# Patient Record
Sex: MALE | Race: WHITE | NOT HISPANIC OR LATINO | URBAN - METROPOLITAN AREA
[De-identification: names, ages, dates, MRNs, and addresses within clinical notes are randomized per-mention and may not be internally consistent; named-entity substitution may affect disease eponyms.]

---

## 2019-05-03 ENCOUNTER — OUTPATIENT (OUTPATIENT)
Dept: OUTPATIENT SERVICES | Facility: HOSPITAL | Age: 59
LOS: 1 days | End: 2019-05-03
Payer: COMMERCIAL

## 2019-05-03 ENCOUNTER — INPATIENT (INPATIENT)
Facility: HOSPITAL | Age: 59
LOS: 9 days | Discharge: HOME | DRG: 885 | End: 2019-05-13
Attending: PSYCHIATRY & NEUROLOGY | Admitting: PSYCHIATRY & NEUROLOGY
Payer: COMMERCIAL

## 2019-05-03 VITALS
TEMPERATURE: 98 F | WEIGHT: 195.11 LBS | RESPIRATION RATE: 18 BRPM | SYSTOLIC BLOOD PRESSURE: 132 MMHG | OXYGEN SATURATION: 97 % | HEIGHT: 72 IN | HEART RATE: 96 BPM | DIASTOLIC BLOOD PRESSURE: 84 MMHG

## 2019-05-03 DIAGNOSIS — F32.2 MAJOR DEPRESSIVE DISORDER, SINGLE EPISODE, SEVERE WITHOUT PSYCHOTIC FEATURES: ICD-10-CM

## 2019-05-03 DIAGNOSIS — R45.851 SUICIDAL IDEATIONS: ICD-10-CM

## 2019-05-03 DIAGNOSIS — F10.10 ALCOHOL ABUSE, UNCOMPLICATED: ICD-10-CM

## 2019-05-03 LAB
ALBUMIN SERPL ELPH-MCNC: 4.1 G/DL — SIGNIFICANT CHANGE UP (ref 3.3–5)
ALP SERPL-CCNC: 65 U/L — SIGNIFICANT CHANGE UP (ref 30–120)
ALT FLD-CCNC: 26 U/L DA — SIGNIFICANT CHANGE UP (ref 10–60)
ANION GAP SERPL CALC-SCNC: 12 MMOL/L — SIGNIFICANT CHANGE UP (ref 5–17)
APAP SERPL-MCNC: <1 UG/ML — LOW (ref 10–30)
APPEARANCE UR: CLEAR — SIGNIFICANT CHANGE UP
AST SERPL-CCNC: 9 U/L — LOW (ref 10–40)
BASOPHILS # BLD AUTO: 0.02 K/UL — SIGNIFICANT CHANGE UP (ref 0–0.2)
BASOPHILS NFR BLD AUTO: 0.3 % — SIGNIFICANT CHANGE UP (ref 0–2)
BILIRUB SERPL-MCNC: 0.4 MG/DL — SIGNIFICANT CHANGE UP (ref 0.2–1.2)
BILIRUB UR-MCNC: NEGATIVE — SIGNIFICANT CHANGE UP
BUN SERPL-MCNC: 13 MG/DL — SIGNIFICANT CHANGE UP (ref 7–23)
CALCIUM SERPL-MCNC: 8.9 MG/DL — SIGNIFICANT CHANGE UP (ref 8.4–10.5)
CHLORIDE SERPL-SCNC: 103 MMOL/L — SIGNIFICANT CHANGE UP (ref 96–108)
CO2 SERPL-SCNC: 26 MMOL/L — SIGNIFICANT CHANGE UP (ref 22–31)
COLOR SPEC: YELLOW — SIGNIFICANT CHANGE UP
CREAT SERPL-MCNC: 0.78 MG/DL — SIGNIFICANT CHANGE UP (ref 0.5–1.3)
DIFF PNL FLD: ABNORMAL
EOSINOPHIL # BLD AUTO: 0.11 K/UL — SIGNIFICANT CHANGE UP (ref 0–0.5)
EOSINOPHIL NFR BLD AUTO: 1.6 % — SIGNIFICANT CHANGE UP (ref 0–6)
ETHANOL SERPL-MCNC: 23 MG/DL — HIGH (ref 0–3)
GLUCOSE SERPL-MCNC: 96 MG/DL — SIGNIFICANT CHANGE UP (ref 70–99)
GLUCOSE UR QL: NEGATIVE MG/DL — SIGNIFICANT CHANGE UP
HCT VFR BLD CALC: 45.6 % — SIGNIFICANT CHANGE UP (ref 39–50)
HGB BLD-MCNC: 16 G/DL — SIGNIFICANT CHANGE UP (ref 13–17)
IMM GRANULOCYTES NFR BLD AUTO: 0.4 % — SIGNIFICANT CHANGE UP (ref 0–1.5)
KETONES UR-MCNC: ABNORMAL
LEUKOCYTE ESTERASE UR-ACNC: NEGATIVE — SIGNIFICANT CHANGE UP
LYMPHOCYTES # BLD AUTO: 1.55 K/UL — SIGNIFICANT CHANGE UP (ref 1–3.3)
LYMPHOCYTES # BLD AUTO: 23.1 % — SIGNIFICANT CHANGE UP (ref 13–44)
MCHC RBC-ENTMCNC: 33 PG — SIGNIFICANT CHANGE UP (ref 27–34)
MCHC RBC-ENTMCNC: 35.1 GM/DL — SIGNIFICANT CHANGE UP (ref 32–36)
MCV RBC AUTO: 94 FL — SIGNIFICANT CHANGE UP (ref 80–100)
MONOCYTES # BLD AUTO: 0.56 K/UL — SIGNIFICANT CHANGE UP (ref 0–0.9)
MONOCYTES NFR BLD AUTO: 8.3 % — SIGNIFICANT CHANGE UP (ref 2–14)
NEUTROPHILS # BLD AUTO: 4.45 K/UL — SIGNIFICANT CHANGE UP (ref 1.8–7.4)
NEUTROPHILS NFR BLD AUTO: 66.3 % — SIGNIFICANT CHANGE UP (ref 43–77)
NITRITE UR-MCNC: NEGATIVE — SIGNIFICANT CHANGE UP
NRBC # BLD: 0 /100 WBCS — SIGNIFICANT CHANGE UP (ref 0–0)
PCP SPEC-MCNC: SIGNIFICANT CHANGE UP
PH UR: 5 — SIGNIFICANT CHANGE UP (ref 5–8)
PLATELET # BLD AUTO: 228 K/UL — SIGNIFICANT CHANGE UP (ref 150–400)
POTASSIUM SERPL-MCNC: 3.7 MMOL/L — SIGNIFICANT CHANGE UP (ref 3.5–5.3)
POTASSIUM SERPL-SCNC: 3.7 MMOL/L — SIGNIFICANT CHANGE UP (ref 3.5–5.3)
PROT SERPL-MCNC: 7.4 G/DL — SIGNIFICANT CHANGE UP (ref 6–8.3)
PROT UR-MCNC: 30 MG/DL
RBC # BLD: 4.85 M/UL — SIGNIFICANT CHANGE UP (ref 4.2–5.8)
RBC # FLD: 11.6 % — SIGNIFICANT CHANGE UP (ref 10.3–14.5)
SALICYLATES SERPL-MCNC: 2.9 MG/DL — SIGNIFICANT CHANGE UP (ref 2.8–20)
SODIUM SERPL-SCNC: 141 MMOL/L — SIGNIFICANT CHANGE UP (ref 135–145)
SP GR SPEC: 1.02 — SIGNIFICANT CHANGE UP (ref 1.01–1.02)
UROBILINOGEN FLD QL: 1 MG/DL
WBC # BLD: 6.72 K/UL — SIGNIFICANT CHANGE UP (ref 3.8–10.5)
WBC # FLD AUTO: 6.72 K/UL — SIGNIFICANT CHANGE UP (ref 3.8–10.5)

## 2019-05-03 PROCEDURE — 70450 CT HEAD/BRAIN W/O DYE: CPT

## 2019-05-03 PROCEDURE — 70450 CT HEAD/BRAIN W/O DYE: CPT | Mod: 26

## 2019-05-03 PROCEDURE — 71046 X-RAY EXAM CHEST 2 VIEWS: CPT | Mod: 26

## 2019-05-03 PROCEDURE — 99285 EMERGENCY DEPT VISIT HI MDM: CPT

## 2019-05-03 RX ORDER — ACETAMINOPHEN 500 MG
650 TABLET ORAL ONCE
Qty: 0 | Refills: 0 | Status: COMPLETED | OUTPATIENT
Start: 2019-05-03 | End: 2019-05-03

## 2019-05-03 RX ORDER — SODIUM CHLORIDE 9 MG/ML
1000 INJECTION, SOLUTION INTRAVENOUS
Qty: 0 | Refills: 0 | Status: COMPLETED | OUTPATIENT
Start: 2019-05-03 | End: 2019-05-03

## 2019-05-03 RX ORDER — THIAMINE MONONITRATE (VIT B1) 100 MG
100 TABLET ORAL DAILY
Qty: 0 | Refills: 0 | Status: COMPLETED | OUTPATIENT
Start: 2019-05-03 | End: 2019-05-06

## 2019-05-03 RX ORDER — SODIUM CHLORIDE 9 MG/ML
1000 INJECTION INTRAMUSCULAR; INTRAVENOUS; SUBCUTANEOUS ONCE
Qty: 0 | Refills: 0 | Status: COMPLETED | OUTPATIENT
Start: 2019-05-03 | End: 2019-05-03

## 2019-05-03 RX ORDER — NICOTINE POLACRILEX 2 MG
2 GUM BUCCAL
Qty: 0 | Refills: 0 | Status: DISCONTINUED | OUTPATIENT
Start: 2019-05-03 | End: 2019-05-13

## 2019-05-03 RX ORDER — TRAZODONE HCL 50 MG
50 TABLET ORAL AT BEDTIME
Qty: 0 | Refills: 0 | Status: DISCONTINUED | OUTPATIENT
Start: 2019-05-03 | End: 2019-05-06

## 2019-05-03 RX ORDER — HALOPERIDOL DECANOATE 100 MG/ML
5 INJECTION INTRAMUSCULAR EVERY 6 HOURS
Qty: 0 | Refills: 0 | Status: DISCONTINUED | OUTPATIENT
Start: 2019-05-03 | End: 2019-05-13

## 2019-05-03 RX ORDER — SODIUM CHLORIDE 9 MG/ML
3 INJECTION INTRAMUSCULAR; INTRAVENOUS; SUBCUTANEOUS ONCE
Qty: 0 | Refills: 0 | Status: COMPLETED | OUTPATIENT
Start: 2019-05-03 | End: 2019-05-03

## 2019-05-03 RX ORDER — NICOTINE POLACRILEX 2 MG
1 GUM BUCCAL DAILY
Qty: 0 | Refills: 0 | Status: DISCONTINUED | OUTPATIENT
Start: 2019-05-03 | End: 2019-05-13

## 2019-05-03 RX ORDER — FOLIC ACID 0.8 MG
1 TABLET ORAL DAILY
Qty: 0 | Refills: 0 | Status: DISCONTINUED | OUTPATIENT
Start: 2019-05-03 | End: 2019-05-13

## 2019-05-03 RX ADMIN — Medication 650 MILLIGRAM(S): at 12:45

## 2019-05-03 RX ADMIN — SODIUM CHLORIDE 1000 MILLILITER(S): 9 INJECTION INTRAMUSCULAR; INTRAVENOUS; SUBCUTANEOUS at 11:45

## 2019-05-03 RX ADMIN — SODIUM CHLORIDE 3 MILLILITER(S): 9 INJECTION INTRAMUSCULAR; INTRAVENOUS; SUBCUTANEOUS at 11:30

## 2019-05-03 RX ADMIN — Medication 2 MILLIGRAM(S): at 21:34

## 2019-05-03 RX ADMIN — Medication 50 MILLIGRAM(S): at 21:30

## 2019-05-03 RX ADMIN — SODIUM CHLORIDE 1000 MILLILITER(S): 9 INJECTION INTRAMUSCULAR; INTRAVENOUS; SUBCUTANEOUS at 12:45

## 2019-05-03 RX ADMIN — SODIUM CHLORIDE 125 MILLILITER(S): 9 INJECTION, SOLUTION INTRAVENOUS at 12:45

## 2019-05-03 RX ADMIN — Medication 650 MILLIGRAM(S): at 16:09

## 2019-05-03 NOTE — ED BEHAVIORAL HEALTH ASSESSMENT NOTE - HPI (INCLUDE ILLNESS QUALITY, SEVERITY, DURATION, TIMING, CONTEXT, MODIFYING FACTORS, ASSOCIATED SIGNS AND SYMPTOMS)
Patient is a 58 year old  male; domiciled with wife and 2 adult children; noncaregiver; full time  ; no formal PPH ; no prior hospitalizations; no known suicide attempts; no known history of violence or arrests; active ETOH abuse, no known history of complicated withdrawal; no known PMH; brought in by AA sponsor after revealed to him that he had written a suicide note and had suicidal plan and intent to shoot himself with his shotgun last night.    Patient reports that over the last month or so he has been under increased stress due to work pressures as well as an issue with the woman with whom he has been having an extra-marital affair for almost 14 years. He states that she has not been acting the same toward him and he began to feel that she was trying to "frame him" and "record him". He reports that he does not know why she is doing this, is worried that his own mind might be playing tricks on him. He states that he suggested that they break up and she did not object so they ended their relationship a few weeks ago. Patient reports that yesterday he realized that a photo he had been keeping of her in a drawer was missing and he began to feel very worried that someone knew about their relationship. He states that he began to drink and wrote a suicide note, had his loaded shotgun next to him. He states that he must have fallen asleep due to the alcohol because he woke up in the morning and was happy that he did not act on it. Patient reports excessive worry, low mood, low energy, trouble sleeping over the last few weeks. He states that he has no appetite and has lost 10-20lbs over the last month. The patient denies manic symptoms, past and present.  The patient denies auditory or visual hallucinations, and no delusions could be elicited on direct questioning.  The patient denies current suicidal ideation, homicidal ideation, intent, or plan.     [facility] obtained collateral from wife. At baseline patient is described as jovial and socially outgoing. Patient works fulltime, attends AA meetings 2x weekly and works with his sponsor daily. Patient is without psychiatric complaints at baseline and is not engaged in outpatient psychiatric care. Collateral reports noticing a change inpatient 1 month ago with suspicions of relapse after reaching 1 year of sobriety.  Collateral states she was unable to confirm relapse until last night, when patient was found at 4:30am incoherent in the backyard drinking. Collateral notes that patient refused to tell her what was going on and he left this morning with his sponsor. Collateral endorsed recent changes in patient’s socialization, noting him to be more withdrawn and sullen for the last month. persistent sad mood, anhedonia, fatigue, weight loss of 20 pounds, low motivation and hypersomnia, lasting for 1 month. Collateral elevated mood, increased irritability, mood lability, distractibility, grandiosity, pressured speech, increase in goal-directed activity, decreased need for sleep, paranoia, ideas of reference, thought insertion/broadcasting, or auditory/visual/olfactory/tactile/gustatory hallucinations. Collateral denies verbalization of suicidality and homicidality. Denies history of violence and aggression. Denies trauma history. Denies substance use. Patient has not had any past psychiatric visits, hospitalizations, medication trials or visits with a therapist or a counselor. No hx of suicidality, suicidal attempts or self- injurious behavior in the past. Reports she is unsure about safety concerns at this time, reports that patient was incoherent this morning and she has not been able to speak with him. Collateral states that patient would not tell her what was going on. Believes patient may benefit from inpatient hospitalization but outpatient could also be an option.

## 2019-05-03 NOTE — ED ADULT NURSE NOTE - NSIMPLEMENTINTERV_GEN_ALL_ED
Implemented All Fall Risk Interventions:  [place] to call system. Call bell, personal items and telephone within reach. Instruct patient to call for assistance. Room bathroom lighting operational. Non-slip footwear when patient is off stretcher. Physically safe environment: no spills, clutter or unnecessary equipment. Stretcher in lowest position, wheels locked, appropriate side rails in place. Provide visual cue, wrist band, yellow gown, etc. Monitor gait and stability. Monitor for mental status changes and reorient to person, place, and time. Review medications for side effects contributing to fall risk. Reinforce activity limits and safety measures with patient and family.

## 2019-05-03 NOTE — ED ADULT NURSE REASSESSMENT NOTE - NS ED NURSE REASSESS COMMENT FT1
Patient awaiting transfer to [facility] for head CT. Calm cooperative . 1:1 in place. Report given to EMS/ transfer center.

## 2019-05-03 NOTE — ED BEHAVIORAL HEALTH ASSESSMENT NOTE - DESCRIPTION
Patient brought to ED by friend at 11:05: telepsychiatry consulted at 16:06. Patient was noted to be neat and cleanly at triage. Patient was tearful while discussing events leading to ED visit. Patient endorsed SI with plan to shot self. Patient was cooperative with triage process. Patient provided blood and urine specimens willingly. Patient changed into hospital gown and allowed for security check without incident.  Patient was given 650mg of Tylenol at 12:45 for headache. Patient was transported to [place] ED at 13:30 for CT and returned to ED at 14:30. Patient has been observed resting comfortably on stretcher. Patient continues to endorse SI. Patient presents with depressed mood. Patient is noted to have speech at normal rate and volume. Patient is logical and linear. Patient is noted to be alert and oriented x4. Patient is able to maintain appropriate eye contact with staff and engage in appropriate interpersonal exchanges. Primary nurse reports that he has been in appropriate behavioral control, not requiring medication or security intervention.     Vital Signs Last 24 Hrs  T(C): 36.4 (03 May 2019 14:15), Max: 36.6 (03 May 2019 11:19)  T(F): 97.5 (03 May 2019 14:15), Max: 97.9 (03 May 2019 11:19)  HR: 96 (03 May 2019 14:15) (96 - 96)  BP: 140/73 (03 May 2019 14:15) (132/84 - 140/73)  BP(mean): --  RR: 16 (03 May 2019 14:15) (16 - 18)  SpO2: 97% (03 May 2019 14:15) (97% - 97%) denies living w wife and 2 adult children, works in insurance [employer]

## 2019-05-03 NOTE — ED BEHAVIORAL HEALTH ASSESSMENT NOTE - RISK ASSESSMENT
Patient is at increased risk of danger to himself due to worsening depression, suicidal ideation and near suicide attempt as well as substance abuse Acute Suicide Risk  (X  ) High   (  ) Moderate   (  ) Low   (  ) Unable to determine   Rationale pt w suicidal ideation, plan, means    Elevated Chronic Risk   (  ) Yes ___________  Details ___________  (X  ) No - no prior attempts or treatment

## 2019-05-03 NOTE — ED PROVIDER NOTE - CLINICAL SUMMARY MEDICAL DECISION MAKING FREE TEXT BOX
medically clear pt and consult telepsych. medically clear pt, ct head to r/o ich and consult telepsych.

## 2019-05-03 NOTE — ED BEHAVIORAL HEALTH ASSESSMENT NOTE - PSYCHIATRIC ISSUES AND PLAN (INCLUDE STANDING AND PRN MEDICATION)
Haldol 5mg IM q6hr PRN agitation, Ativan 2mg IM q6hr PRN agitation Haldol 5mg IM q6hr PRN agitation, Ativan 2mg IM q6hr PRN agitation, trazodone 50mg qhs PRN insomnia

## 2019-05-03 NOTE — ED BEHAVIORAL HEALTH ASSESSMENT NOTE - SUMMARY
Patient is a 58 year old  male; domiciled with wife and 2 adult children; noncaregiver; full time  ; no formal PPH ; no prior hospitalizations; no known suicide attempts; no known history of violence or arrests; active ETOH abuse, no known history of complicated withdrawal; no known PMH; brought in by AA sponsor after revealed to him that he had written a suicide note and had suicidal plan and intent to shoot himself with his shotgun last night. Patient reports worsening anxiety and depression over the last month, came very close to a suicide attempt last night. Patient is currently at increased risk of danger to himself and requires inpt psychiatric hospitalization for stabilization at this time

## 2019-05-03 NOTE — ED ADULT NURSE NOTE - OBJECTIVE STATEMENT
Was a recovered alcoholic for 18 months and started drinking again about 2 years ago due to increased stress at work. States that he has had fleeding thoughts of suicide for several weeks. Denies plan, denies attemps. Patient calm and cooperative.

## 2019-05-03 NOTE — ED BEHAVIORAL HEALTH ASSESSMENT NOTE - SUICIDE RISK FACTORS
Hopelessness/Mood episode/Substance abuse/dependence/Access to means (pills, firearms, etc.)/Perceived burden on family and others/Unable to engage in safety planning/Agitation/severe anxiety

## 2019-05-03 NOTE — ED PROVIDER NOTE - PROGRESS NOTE DETAILS
Antonio CODY for Dr Taylor: 59 y/o M pt with hx of EtOH abuse c/o increased depression and stress due to marital problems and home stress. Has been having intermittent SI for 2 weeks, last night thought about shooting self in head with his shot gun. Pt reports he did write a suicide note. Pt denies f/c, dizziness, n/v, CP, SOB, abd pain. PE WD, WN, NAD, PERRL, EOMI, moist MM. s1 s2 RRR, lungs CTA bilateral, skin warm/dry/intact, abd soft NT ND. Pt alert, awake and oriented, depressed affect, speech clear, memory intact, verbalizing wish to harm self. Plan - EKG, labs and telepsych eval labs and imaging reviewed. telepsych will eval pt. pt with  weight loss over the past few months. advised out pt follow up with GI and urology due to minimal blood in urine and hx of smoking. may consider hospitalist consult during psych admission. dr clemons advised pt needs voluntary psych admission. will admit.

## 2019-05-03 NOTE — ED PROVIDER NOTE - OBJECTIVE STATEMENT
pt is a 59yo male with pmhx of etoh abuse c/o suicidal ideation x days. pt reports he has a lot of a paranoia and stress due to work and home stressors. pt reports this "includes an extramarital affair" increasing stress. pt reports he wanted to "end it all last night by putting a shotgun" to his head. pt reports he left a 6 page note for his family which they found prompting him to call his AA sponsor who brought pt for evaluation. pt reports he has been drinking more etoh than usual, drank 2 pints last pm. pt denies previous hx of suicidal attempts or ideation, vision changes, cp, sob.    pmd: tessy pt is a 59yo male with pmhx of etoh abuse c/o suicidal ideation x days. pt reports he has a lot of a paranoia and stress due to work and home stressors. pt reports this "includes an extramarital affair" increasing stress. pt reports he wanted to "end it all last night by putting a shotgun" to his head. pt reports he left a 6 page note for his family which they found prompting him to call his AA sponsor who brought pt for evaluation. pt reports he has been drinking more etoh than usual, drank 2 pints last pm. pt endorses headache worsening since this am but reports he has not ate or drank today, pt denies previous hx of suicidal attempts or ideation, vision changes, cp, sob.    pmd: tessy

## 2019-05-04 DIAGNOSIS — F10.10 ALCOHOL ABUSE, UNCOMPLICATED: ICD-10-CM

## 2019-05-04 LAB
CHOLEST SERPL-MCNC: 143 MG/DL — SIGNIFICANT CHANGE UP (ref 10–199)
HBA1C BLD-MCNC: 5.4 % — SIGNIFICANT CHANGE UP (ref 4–5.6)
HDLC SERPL-MCNC: 48 MG/DL — SIGNIFICANT CHANGE UP
LIPID PNL WITH DIRECT LDL SERPL: 77 MG/DL — SIGNIFICANT CHANGE UP
TOTAL CHOLESTEROL/HDL RATIO MEASUREMENT: 3 RATIO — LOW (ref 3.4–9.6)
TRIGL SERPL-MCNC: 88 MG/DL — SIGNIFICANT CHANGE UP (ref 10–149)

## 2019-05-04 PROCEDURE — 90792 PSYCH DIAG EVAL W/MED SRVCS: CPT

## 2019-05-04 RX ORDER — ACETAMINOPHEN 500 MG
650 TABLET ORAL EVERY 6 HOURS
Qty: 0 | Refills: 0 | Status: DISCONTINUED | OUTPATIENT
Start: 2019-05-04 | End: 2019-05-13

## 2019-05-04 RX ORDER — HYDROXYZINE HCL 10 MG
50 TABLET ORAL EVERY 6 HOURS
Qty: 0 | Refills: 0 | Status: DISCONTINUED | OUTPATIENT
Start: 2019-05-04 | End: 2019-05-13

## 2019-05-04 RX ADMIN — Medication 100 MILLIGRAM(S): at 09:22

## 2019-05-04 RX ADMIN — Medication 1 TABLET(S): at 09:22

## 2019-05-04 RX ADMIN — Medication 50 MILLIGRAM(S): at 20:32

## 2019-05-04 RX ADMIN — Medication 1 MILLIGRAM(S): at 09:23

## 2019-05-04 NOTE — PROGRESS NOTE BEHAVIORAL HEALTH - NSBHADMITMEDEDUDETAILS_A_CORE FT
symptoms triggered CIWA; PRNs for anxiety/agitation/sleep/etc; not starting any standing psych medications at this time symptoms triggered CIWA; PRNs for anxiety/agitation/sleep/etc; not starting any standing psych medications at this time. Patient very much needs therapy as part of his discharge plan

## 2019-05-04 NOTE — PROGRESS NOTE BEHAVIORAL HEALTH - NSBHADDHXPSYCHFT_PSY_A_CORE
no formal psych history no prior admissions; no prior suicide attempts; no hx of aggression or violence; patient never had therapy of any sort

## 2019-05-04 NOTE — PROGRESS NOTE BEHAVIORAL HEALTH - SUMMARY
Patient is a 58 year old  male; domiciled with wife and 2 adult children; noncaregiver; full time  ; no formal PPH ; no prior hospitalizations; no known suicide attempts; no known history of violence or arrests; active ETOH abuse, no known history of complicated withdrawal; no known PMH; brought in by AA sponsor after revealed to him that he had written a suicide note and had suicidal plan and intent to shoot himself with his shotgun last night. Patient reports worsening anxiety and depression over the last month, came very close to a suicide attempt last night. Patient is currently at increased risk of danger to himself and requires inpt psychiatric hospitalization for stabilization at this time. patient signed voluntarily to Unit 1N.

## 2019-05-04 NOTE — PROGRESS NOTE BEHAVIORAL HEALTH - NSBHADDHXSUBSTFT_PSY_A_CORE
reports recent relapse on alcohol, prior sobriety x 4.5 years and attends AA; denies other substance use reports recent relapse on alcohol, prior sobriety x 4.5 years and attends AA; denies other substance use. longest period of sobriety 1-1.5 yrs but did drink seldom wine with dinner during it. + no hx of withdrawal seziures/DTs; used to drink heavily daily.

## 2019-05-04 NOTE — PROGRESS NOTE BEHAVIORAL HEALTH - NSBHFUPSTRENGTHS_PSY_A_CORE
Motivated and ready for change/Steady employment/In good physical health/Has supportive interpersonal relationships with family, friends or peers/Has vocational interests or hobbies

## 2019-05-04 NOTE — PROGRESS NOTE BEHAVIORAL HEALTH - NSBHFUPADDHPIFT_PSY_A_CORE
Patient was seen by Telepsychiatry at the [place] ED and then admitted to unit 1N after brought in by AA sponsor after revealed to him that he had written a suicide note and had suicidal plan and intent to shoot himself with his shotgun the night before presentation. Patient signed 9.13 voluntary Patient was seen by Telepsychiatry at the [place] ED and then admitted to unit 1N after brought in by AA sponsor after revealed to him that he had written a suicide note and had suicidal plan and intent to shoot himself with his shotgun the night before presentation while heavily consuming cognac & [place] to the point of falling asleep/black out. Patient signed 9.13 voluntary

## 2019-05-05 DIAGNOSIS — F32.3 MAJOR DEPRESSIVE DISORDER, SINGLE EPISODE, SEVERE WITH PSYCHOTIC FEATURES: ICD-10-CM

## 2019-05-05 DIAGNOSIS — F22 DELUSIONAL DISORDERS: ICD-10-CM

## 2019-05-05 PROCEDURE — 99233 SBSQ HOSP IP/OBS HIGH 50: CPT

## 2019-05-05 RX ORDER — QUETIAPINE FUMARATE 200 MG/1
25 TABLET, FILM COATED ORAL
Qty: 0 | Refills: 0 | Status: DISCONTINUED | OUTPATIENT
Start: 2019-05-05 | End: 2019-05-07

## 2019-05-05 RX ADMIN — Medication 1 PATCH: at 10:27

## 2019-05-05 RX ADMIN — QUETIAPINE FUMARATE 25 MILLIGRAM(S): 200 TABLET, FILM COATED ORAL at 20:58

## 2019-05-05 RX ADMIN — Medication 100 MILLIGRAM(S): at 08:37

## 2019-05-05 RX ADMIN — Medication 1 MILLIGRAM(S): at 08:37

## 2019-05-05 RX ADMIN — Medication 1 TABLET(S): at 08:37

## 2019-05-05 NOTE — PROGRESS NOTE BEHAVIORAL HEALTH - SUMMARY
Patient is a 58 year old  male; domiciled with wife and 2 adult children; noncaregiver; full time  ; no formal PPH ; no prior hospitalizations; no known suicide attempts; no known history of violence or arrests; active ETOH abuse, no known history of complicated withdrawal; no known PMH; brought in by AA sponsor after revealed to him that he had written a suicide note and had suicidal plan and intent to shoot himself with his shotgun last night. Patient reports worsening anxiety and depression over the last month, came very close to a suicide attempt last night. Patient is currently at increased risk of danger to himself and requires inpt psychiatric hospitalization for stabilization at this time. patient signed voluntarily to Unit 1N. Patient is a 58 year old  male; domiciled with wife and 2 adult children; noncaregiver; full time  ; no formal PPH ; no prior hospitalizations; no known suicide attempts; no known history of violence or arrests; active ETOH abuse, no known history of complicated withdrawal; no known PMH; brought in by AA sponsor after revealed to him that he had written a suicide note and had suicidal plan and intent to shoot himself with his shotgun last night. Patient reports worsening anxiety and depression over the last month, came very close to a suicide attempt last night.   He recently started to suspect that one of his coworkers was having a relationship with his exgirlfriend, though he has no evidence for this.  He became paranoid that this coworker audio-taped him during a visit to a bar when he was confessing that he had the affair.  He also thought his ex-girl friend taped him during their last meeting at a restaurant.   Patient reports that he really "freaked out" when he realized that a partially naked photo he had been keeping of her in a drawer was missing and he began to feel very worried some one stole it and would show it to his boss at work and this would cause him to lose his job. He then began to drink and wrote a suicide note, had his loaded shotgun next to him. Patient is currently at increased risk of danger to himself and requires inpt psychiatric hospitalization for stabilization at this time. patient signed voluntarily to Unit 1N.  Impression MDD severe with paranoia  Will begin Seroquel 25mg q 8pm

## 2019-05-05 NOTE — PROGRESS NOTE BEHAVIORAL HEALTH - PRIMARY DX
Current severe episode of major depressive disorder without psychotic features without prior episode Current severe episode of major depressive disorder with psychotic features without prior episode

## 2019-05-05 NOTE — PROGRESS NOTE BEHAVIORAL HEALTH - NSBHCHARTREVIEWVS_PSY_A_CORE FT
Vital Signs Last 24 Hrs  T(C): 36.4 (05 May 2019 08:47), Max: 37 (04 May 2019 18:48)  T(F): 97.6 (05 May 2019 08:47), Max: 98.6 (04 May 2019 18:48)  HR: 76 (05 May 2019 08:47) (63 - 79)  BP: 132/82 (05 May 2019 08:47) (118/81 - 132/82)  BP(mean): --  RR: 16 (05 May 2019 08:47) (16 - 17)  SpO2: 97% (04 May 2019 18:48) (97% - 97%)

## 2019-05-05 NOTE — PROGRESS NOTE BEHAVIORAL HEALTH - NSBHADMITMEDEDUDETAILS_A_CORE FT
symptoms triggered CIWA; PRNs for anxiety/agitation/sleep/etc; not starting any standing psych medications at this time. Patient very much needs therapy as part of his discharge plan

## 2019-05-06 PROCEDURE — 99233 SBSQ HOSP IP/OBS HIGH 50: CPT

## 2019-05-06 RX ORDER — THIAMINE MONONITRATE (VIT B1) 100 MG
100 TABLET ORAL DAILY
Qty: 0 | Refills: 0 | Status: DISCONTINUED | OUTPATIENT
Start: 2019-05-06 | End: 2019-05-13

## 2019-05-06 RX ORDER — TRAZODONE HCL 50 MG
50 TABLET ORAL
Qty: 0 | Refills: 0 | Status: DISCONTINUED | OUTPATIENT
Start: 2019-05-06 | End: 2019-05-07

## 2019-05-06 RX ADMIN — Medication 100 MILLIGRAM(S): at 08:44

## 2019-05-06 RX ADMIN — Medication 1 PATCH: at 20:29

## 2019-05-06 RX ADMIN — Medication 1 TABLET(S): at 08:43

## 2019-05-06 RX ADMIN — Medication 1 MILLIGRAM(S): at 08:43

## 2019-05-06 RX ADMIN — Medication 1 PATCH: at 08:46

## 2019-05-06 RX ADMIN — Medication 50 MILLIGRAM(S): at 20:33

## 2019-05-06 RX ADMIN — Medication 1 PATCH: at 08:44

## 2019-05-06 RX ADMIN — Medication 1 PATCH: at 08:43

## 2019-05-06 RX ADMIN — QUETIAPINE FUMARATE 25 MILLIGRAM(S): 200 TABLET, FILM COATED ORAL at 20:29

## 2019-05-06 NOTE — PROGRESS NOTE BEHAVIORAL HEALTH - SUMMARY
Patient is a 58 year old  male; domiciled with wife and 2 adult children; noncaregiver; full time  ; no formal PPH ; no prior hospitalizations; no known suicide attempts; no known history of violence or arrests; active ETOH abuse, no known history of complicated withdrawal; no known PMH; brought in by AA sponsor after revealed to him that he had written a suicide note and had suicidal plan and intent to shoot himself with his shotgun last night. Patient reports worsening anxiety and depression over the last month, came very close to a suicide attempt last night.   He recently started to suspect that one of his coworkers was having a relationship with his exgirlfriend, though he has no evidence for this.  He became paranoid that this coworker audio-taped him during a visit to a bar when he was confessing that he had the affair.  He also thought his ex-girl friend taped him during their last meeting at a restaurant.   Patient reports that he really "freaked out" when he realized that a partially naked photo he had been keeping of her in a drawer was missing and he began to feel very worried some one stole it and would show it to his boss at work and this would cause him to lose his job. He then began to drink and wrote a suicide note, had his loaded shotgun next to him. Patient is currently at increased risk of danger to himself and requires inpt psychiatric hospitalization for stabilization at this time. patient signed voluntarily to Unit 1N. He understands that his wife was contacted to be sure that he no longer has a gun.  He agrees that the writer may also call his wife at [phone].  Pt agrees that the Seroquel is helping him feel less worried and paranoid and was able to discuss risks and benefits. He is asking about alcohol rehabs and wants to stop drinking and smoking.  Impression MDD severe with paranoia  Will continue Seroquel 25mg q 8pm  consider starting Wellbutrin XR 150mg daily for smoking cessation.

## 2019-05-06 NOTE — PROGRESS NOTE BEHAVIORAL HEALTH - NSBHCHARTREVIEWVS_PSY_A_CORE FT
Vital Signs Last 24 Hrs  T(C): 36.7 (06 May 2019 13:26), Max: 36.7 (06 May 2019 13:26)  T(F): 98 (06 May 2019 13:26), Max: 98 (06 May 2019 13:26)  HR: 90 (06 May 2019 16:19) (90 - 93)  BP: 127/82 (06 May 2019 16:19) (111/76 - 127/82)  BP(mean): --  RR: 17 (06 May 2019 16:19) (16 - 17)  SpO2: --

## 2019-05-06 NOTE — OCCUPATIONAL THERAPY INITIAL EVALUATION ADULT - SOCIAL CONCERNS
Is having an extramarital affair and recently the girlfriend broke up with him and he has not been dealing well with stress

## 2019-05-06 NOTE — OCCUPATIONAL THERAPY INITIAL EVALUATION ADULT - PERTINENT HX OF CURRENT PROBLEM, REHAB EVAL
Pt has been using alcohol and made comments about hurting himself with a shot gun. He has also been paranoid, thinking his coworkers are recording him and stealing from him in order to get with a girl he was seeing, that is not his wife. Pt has been using alcohol and made comments about hurting himself with a shot gun. Pt. has been exhibiting paranoia, thinking his coworkers are recording him and stealing stuff from him.

## 2019-05-06 NOTE — OCCUPATIONAL THERAPY INITIAL EVALUATION ADULT - ADDITIONAL COMMENTS
Recommendations: Pt. would benefit from skilled Occupational Therapy intervention services to increase self-awareness, increase socialization, learn community resources, learn time management, learn medication management, learn anger management techniques, learn coping skills, increase self-esteem, increase physical activity, increase attention span, improve sleep, stop substance abuse, identify strengths, express feelings, acquire new hobbies, learn relaxation techniques, learn stress management, improve self-scare, and to learn ways to increase their support system.    Pt was educated on expectations while on this unit to include: be medication compliant, attend and participate in all skilled OT intervention groups, and to attend to his/her own self-care. Pt was informed of the types of the skilled intervention groups with CBT that are offered here, and was encouraged to attend all.

## 2019-05-07 LAB
T PALLIDUM AB TITR SER: NEGATIVE — SIGNIFICANT CHANGE UP
TSH SERPL-MCNC: 3.86 UIU/ML — SIGNIFICANT CHANGE UP (ref 0.27–4.2)

## 2019-05-07 PROCEDURE — 99233 SBSQ HOSP IP/OBS HIGH 50: CPT

## 2019-05-07 RX ORDER — TRAZODONE HCL 50 MG
50 TABLET ORAL
Qty: 0 | Refills: 0 | Status: DISCONTINUED | OUTPATIENT
Start: 2019-05-07 | End: 2019-05-10

## 2019-05-07 RX ORDER — QUETIAPINE FUMARATE 200 MG/1
50 TABLET, FILM COATED ORAL
Qty: 0 | Refills: 0 | Status: DISCONTINUED | OUTPATIENT
Start: 2019-05-07 | End: 2019-05-08

## 2019-05-07 RX ADMIN — Medication 1 MILLIGRAM(S): at 08:40

## 2019-05-07 RX ADMIN — Medication 1 TABLET(S): at 08:43

## 2019-05-07 RX ADMIN — Medication 1 PATCH: at 07:51

## 2019-05-07 RX ADMIN — Medication 100 MILLIGRAM(S): at 08:40

## 2019-05-07 RX ADMIN — Medication 1 PATCH: at 08:41

## 2019-05-07 RX ADMIN — QUETIAPINE FUMARATE 50 MILLIGRAM(S): 200 TABLET, FILM COATED ORAL at 21:23

## 2019-05-07 RX ADMIN — Medication 50 MILLIGRAM(S): at 21:23

## 2019-05-07 NOTE — CHART NOTE - NSCHARTNOTEFT_GEN_A_CORE
Psych NP Note:        Patient reported SI with plan to kill self by shooting self with shotgun.  [facility]  report made.  Reference number:  [id number].  Rashida Michelle NPP

## 2019-05-07 NOTE — PROGRESS NOTE BEHAVIORAL HEALTH - SUMMARY
Patient is a 58 year old  male; domiciled with wife and 2 adult children; noncaregiver; full time  ; no formal PPH ; no prior hospitalizations; no known suicide attempts; no known history of violence or arrests; active ETOH abuse, no known history of complicated withdrawal; no known PMH; brought in by AA sponsor after revealed to him that he had written a suicide note and had suicidal plan and intent to shoot himself with his shotgun last night. Patient reports worsening anxiety and depression over the last month, came very close to a suicide attempt last night.   He recently started to suspect that one of his coworkers was having a relationship with his ex-girlfriend, though he has no evidence for this.  He became paranoid that this coworker audio-taped him during a visit to a bar when he was confessing that he had the affair.  He also thought his ex-girl friend taped him during their last meeting at a restaurant.   Patient reports that he really "freaked out" when he realized that a partially naked photo he had been keeping of her in a drawer was missing and he began to feel very worried some one stole it and would show it to his boss at work and this would cause him to lose his job. He then began to drink and wrote a suicide note, had his loaded shotgun next to him. Patient is currently at increased risk of danger to himself and requires inpt psychiatric hospitalization for stabilization at this time. patient signed voluntarily to Unit 1N. He understands that his wife was contacted to be sure that he no longer has a gun.  He agrees that the writer may also call his wife at [phone].  Pt agrees that the Seroquel is helping him feel less worried and paranoid and was able to discuss risks and benefits. He is asking about alcohol rehabs and wants to stop drinking and smoking.  Impression MDD severe with paranoia.  Will increase Seroquel 50mg q 8pm  consider starting Wellbutrin XR 150mg daily for smoking cessation once pt is less paranoid.

## 2019-05-07 NOTE — PROGRESS NOTE BEHAVIORAL HEALTH - NSBHCHARTREVIEWVS_PSY_A_CORE FT
Vital Signs Last 24 Hrs  T(C): 36.7 (07 May 2019 11:14), Max: 36.7 (07 May 2019 11:14)  T(F): 98 (07 May 2019 11:14), Max: 98 (07 May 2019 11:14)  HR: 102 (07 May 2019 16:09) (96 - 102)  BP: 126/84 (07 May 2019 16:09) (126/84 - 129/88)  BP(mean): --  RR: 19 (07 May 2019 16:09) (18 - 19)  SpO2: --

## 2019-05-08 PROCEDURE — 99233 SBSQ HOSP IP/OBS HIGH 50: CPT

## 2019-05-08 RX ORDER — QUETIAPINE FUMARATE 200 MG/1
75 TABLET, FILM COATED ORAL
Qty: 0 | Refills: 0 | Status: DISCONTINUED | OUTPATIENT
Start: 2019-05-08 | End: 2019-05-09

## 2019-05-08 RX ADMIN — Medication 1 PATCH: at 08:40

## 2019-05-08 RX ADMIN — QUETIAPINE FUMARATE 75 MILLIGRAM(S): 200 TABLET, FILM COATED ORAL at 21:07

## 2019-05-08 RX ADMIN — Medication 50 MILLIGRAM(S): at 21:07

## 2019-05-08 RX ADMIN — Medication 1 PATCH: at 19:58

## 2019-05-08 RX ADMIN — Medication 1 TABLET(S): at 08:40

## 2019-05-08 RX ADMIN — Medication 1 PATCH: at 07:39

## 2019-05-08 RX ADMIN — Medication 1 MILLIGRAM(S): at 08:40

## 2019-05-08 RX ADMIN — Medication 100 MILLIGRAM(S): at 08:40

## 2019-05-08 NOTE — PROGRESS NOTE BEHAVIORAL HEALTH - NSBHCHARTREVIEWVS_PSY_A_CORE FT
Vital Signs Last 24 Hrs  T(C): 36.6 (08 May 2019 10:31), Max: 36.6 (08 May 2019 10:31)  T(F): 97.8 (08 May 2019 10:31), Max: 97.8 (08 May 2019 10:31)  HR: 89 (08 May 2019 10:31) (89 - 89)  BP: 129/78 (08 May 2019 10:31) (129/78 - 129/78)  BP(mean): --  RR: 18 (08 May 2019 10:31) (18 - 18)  SpO2: --

## 2019-05-08 NOTE — PROGRESS NOTE BEHAVIORAL HEALTH - SUMMARY
Patient is a 58 year old  male; domiciled with wife and 2 adult children; noncaregiver; full time  ; no formal PPH ; no prior hospitalizations; no known suicide attempts; no known history of violence or arrests; active ETOH abuse, no known history of complicated withdrawal; no known PMH; brought in by AA sponsor after revealed to him that he had written a suicide note and had suicidal plan and intent to shoot himself with his shotgun.  Patient reports worsening anxiety and depression over the last month, came very close to a suicide attempt prior to admission.    He recently started to suspect that one of his coworkers was having a relationship with his ex-girlfriend, though he has no evidence for this.  He became paranoid that this coworker audio-taped him during a visit to a bar when he was confessing that he had the affair.  He also thought his ex-girl friend taped him during their last meeting at a restaurant.   Patient reports that he really "freaked out" when he realized that a photo he had been keeping of her in a drawer was missing and he began to feel very worried some one stole it and would show it to his boss at work and this would cause him to lose his job. He then began to drink and wrote a suicide note, had his loaded shotgun next to him. Patient is currently at increased risk of danger to himself and requires inpt psychiatric hospitalization for stabilization at this time. patient signed voluntarily to [facility]. He understands that his wife was contacted to be sure that he no longer has a gun.  He agrees that the writer may also call his wife at [phone].  Pt agrees that the Seroquel is helping him feel less worried and paranoid and was able to discuss risks and benefits. He is asking about alcohol rehabs and wants to stop drinking and smoking.  Impression MDD severe with paranoia.  Will increase Seroquel 75mg q 8pm  consider starting Wellbutrin XR 150mg daily for smoking cessation once pt is less paranoid.

## 2019-05-09 PROCEDURE — 99233 SBSQ HOSP IP/OBS HIGH 50: CPT

## 2019-05-09 RX ORDER — BUPROPION HYDROCHLORIDE 150 MG/1
150 TABLET, EXTENDED RELEASE ORAL DAILY
Refills: 0 | Status: DISCONTINUED | OUTPATIENT
Start: 2019-05-10 | End: 2019-05-10

## 2019-05-09 RX ORDER — QUETIAPINE FUMARATE 200 MG/1
100 TABLET, FILM COATED ORAL
Refills: 0 | Status: DISCONTINUED | OUTPATIENT
Start: 2019-05-09 | End: 2019-05-10

## 2019-05-09 RX ADMIN — QUETIAPINE FUMARATE 100 MILLIGRAM(S): 200 TABLET, FILM COATED ORAL at 21:07

## 2019-05-09 RX ADMIN — Medication 100 MILLIGRAM(S): at 08:38

## 2019-05-09 RX ADMIN — Medication 1 PATCH: at 07:40

## 2019-05-09 RX ADMIN — Medication 1 PATCH: at 20:12

## 2019-05-09 RX ADMIN — Medication 1 PATCH: at 08:39

## 2019-05-09 RX ADMIN — Medication 50 MILLIGRAM(S): at 21:07

## 2019-05-09 RX ADMIN — Medication 1 TABLET(S): at 08:38

## 2019-05-09 RX ADMIN — Medication 1 PATCH: at 08:42

## 2019-05-09 RX ADMIN — Medication 1 MILLIGRAM(S): at 08:38

## 2019-05-09 NOTE — PROGRESS NOTE BEHAVIORAL HEALTH - SUMMARY
Patient is a 58 year old  male; domiciled with wife and 2 adult children; noncaregiver; full time  ; no formal PPH ; no prior hospitalizations; no known suicide attempts; no known history of violence or arrests; active ETOH abuse, no known history of complicated withdrawal; no known PMH; brought in by AA sponsor after revealed to him that he had written a suicide note and had suicidal plan and intent to shoot himself with his shotgun.  Patient reports worsening anxiety and depression over the last month, came very close to a suicide attempt prior to admission.    He recently started to suspect that one of his coworkers was having a relationship with his ex-girlfriend, though he has no evidence for this.  He became paranoid that this coworker audio-taped him during a visit to a bar when he was confessing that he had the affair.  He also thought his ex-girl friend taped him during their last meeting at a restaurant.   Patient reports that he really "freaked out" when he realized that a photo he had been keeping of her in a drawer was missing and he began to feel very worried some one stole it and would show it to his boss at work and this would cause him to lose his job. He then began to drink and wrote a suicide note, had his loaded shotgun next to him. Patient is currently at increased risk of danger to himself and requires inpt psychiatric hospitalization for stabilization at this time. patient signed voluntarily to [facility]. He understands that his wife was contacted to be sure that he no longer has a gun.  He agrees that the writer may also call his wife at [phone].   His wife stated that his guns had been removed from their home and he would not have any way of taking them back.   Pt agrees to try a higher dose of Seroquel for his paranoia and will begin Wellbutrin in the am to help with his depression.   Impression MDD severe with paranoia.  Will increase Seroquel 100mg q 8pm  Will begin Wellbutrin XR 150mg daily for depression and help with quitting smoking and drinking.

## 2019-05-09 NOTE — PROGRESS NOTE BEHAVIORAL HEALTH - NSBHCHARTREVIEWVS_PSY_A_CORE FT
Vital Signs Last 24 Hrs  T(C): 36.6 (08 May 2019 10:31), Max: 36.6 (08 May 2019 10:31)  T(F): 97.8 (08 May 2019 10:31), Max: 97.8 (08 May 2019 10:31)  HR: 89 (08 May 2019 10:31) (89 - 89)  BP: 129/78 (08 May 2019 10:31) (129/78 - 129/78)  BP(mean): --  RR: 18 (08 May 2019 10:31) (18 - 18)  SpO2: -- Vital Signs Last 24 Hrs  T(C): --  T(F): --  HR: 84 (08 May 2019 16:41) (84 - 84)  BP: 104/67 (08 May 2019 16:41) (104/67 - 104/67)  BP(mean): --  RR: 17 (08 May 2019 16:41) (17 - 17)  SpO2: --

## 2019-05-10 PROCEDURE — 99233 SBSQ HOSP IP/OBS HIGH 50: CPT

## 2019-05-10 RX ORDER — BUPROPION HYDROCHLORIDE 150 MG/1
1 TABLET, EXTENDED RELEASE ORAL
Qty: 0 | Refills: 0 | DISCHARGE
Start: 2019-05-10

## 2019-05-10 RX ORDER — QUETIAPINE FUMARATE 200 MG/1
1 TABLET, FILM COATED ORAL
Qty: 0 | Refills: 0 | DISCHARGE
Start: 2019-05-10

## 2019-05-10 RX ORDER — TRAZODONE HCL 50 MG
1 TABLET ORAL
Qty: 0 | Refills: 0 | DISCHARGE
Start: 2019-05-10

## 2019-05-10 RX ORDER — BUPROPION HYDROCHLORIDE 150 MG/1
150 TABLET, EXTENDED RELEASE ORAL DAILY
Refills: 0 | Status: DISCONTINUED | OUTPATIENT
Start: 2019-05-10 | End: 2019-05-13

## 2019-05-10 RX ORDER — TRAZODONE HCL 50 MG
50 TABLET ORAL
Refills: 0 | Status: DISCONTINUED | OUTPATIENT
Start: 2019-05-10 | End: 2019-05-13

## 2019-05-10 RX ORDER — NICOTINE POLACRILEX 2 MG
0 GUM BUCCAL
Qty: 0 | Refills: 0 | DISCHARGE
Start: 2019-05-10

## 2019-05-10 RX ORDER — ACETAMINOPHEN 500 MG
2 TABLET ORAL
Qty: 0 | Refills: 0 | DISCHARGE
Start: 2019-05-10

## 2019-05-10 RX ORDER — QUETIAPINE FUMARATE 200 MG/1
100 TABLET, FILM COATED ORAL
Refills: 0 | Status: DISCONTINUED | OUTPATIENT
Start: 2019-05-10 | End: 2019-05-13

## 2019-05-10 RX ORDER — FOLIC ACID 0.8 MG
1 TABLET ORAL
Qty: 0 | Refills: 0 | DISCHARGE
Start: 2019-05-10

## 2019-05-10 RX ORDER — THIAMINE MONONITRATE (VIT B1) 100 MG
1 TABLET ORAL
Qty: 0 | Refills: 0 | DISCHARGE
Start: 2019-05-10

## 2019-05-10 RX ADMIN — Medication 1 PATCH: at 08:40

## 2019-05-10 RX ADMIN — Medication 50 MILLIGRAM(S): at 21:11

## 2019-05-10 RX ADMIN — Medication 1 PATCH: at 08:32

## 2019-05-10 RX ADMIN — BUPROPION HYDROCHLORIDE 150 MILLIGRAM(S): 150 TABLET, EXTENDED RELEASE ORAL at 08:32

## 2019-05-10 RX ADMIN — Medication 1 MILLIGRAM(S): at 08:32

## 2019-05-10 RX ADMIN — QUETIAPINE FUMARATE 100 MILLIGRAM(S): 200 TABLET, FILM COATED ORAL at 21:11

## 2019-05-10 RX ADMIN — Medication 100 MILLIGRAM(S): at 08:32

## 2019-05-10 RX ADMIN — Medication 1 TABLET(S): at 08:32

## 2019-05-10 RX ADMIN — Medication 1 PATCH: at 18:53

## 2019-05-10 NOTE — PROGRESS NOTE BEHAVIORAL HEALTH - NSBHCHARTREVIEWVS_PSY_A_CORE FT
Vital Signs Last 24 Hrs  T(C): --  T(F): --  HR: 82 (10 May 2019 15:57) (82 - 82)  BP: 116/65 (10 May 2019 15:57) (116/65 - 116/65)  BP(mean): --  RR: --  SpO2: --

## 2019-05-10 NOTE — DISCHARGE NOTE BEHAVIORAL HEALTH - INSTRUCTIONS
Per instructions from [facility] for [facility], new admissions must brin week of clothing; personal toiletries (alcohol-free); mendez cash; any co-pay or deductible due, as discussion w/ admissions dept. New admissions cannot bring: electronics; bedding items; flat irons or curling irons; food or drink; or contraband.

## 2019-05-10 NOTE — DISCHARGE NOTE BEHAVIORAL HEALTH - MEDICATION SUMMARY - MEDICATIONS TO CHANGE
I will SWITCH the dose or number of times a day I take the medications listed below when I get home from the hospital:    acetaminophen 325 mg oral tablet  -- 2 tab(s) by mouth every 6 hours, As needed, Temp greater or equal to 38C (100.4F), Moderate Pain (4 - 6)    Wellbutrin  mg/24 hours oral tablet, extended release  -- 1 tab(s) by mouth once a day

## 2019-05-10 NOTE — DISCHARGE NOTE BEHAVIORAL HEALTH - NSBHDCCRISISPLAN1FT_PSY_A_CORE
reach out to a loved one for support; call the [facility] 24/7 mental health crisis hotline at [phone]; or present to the nearest emergency room.

## 2019-05-10 NOTE — DISCHARGE NOTE BEHAVIORAL HEALTH - HPI (INCLUDE ILLNESS QUALITY, SEVERITY, DURATION, TIMING, CONTEXT, MODIFYING FACTORS, ASSOCIATED SIGNS AND SYMPTOMS)
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    Patient reports that over the last month or so he has been under increased stress due to work pressures as well as an issue with the woman with whom he has been having an extra-marital affair for almost 14 years. He states that she has not been acting the same toward him and he began to feel that she was trying to "frame him" and "record him".  He also felt that his phone was "bugged" by the FBI and felt he was being recorded at work.  He reports that he does not know why this is happening and is worried that his own mind might be playing tricks on him. He states that he suggested that they break up and his girl friend did not object so they ended their relationship a few weeks ago. Patient reports that yesterday he realized that a photo he had been keeping of her in a drawer was missing and he began to feel very worried that someone knew about their relationship. He states that he began to drink and wrote a suicide note, had his loaded shotgun next to him. He states that he must have fallen asleep due to the alcohol because he woke up in the morning and was happy that he did not act on it. Patient reports excessive worry, low mood, low energy, trouble sleeping over the last few weeks. He states that he has no appetite and has lost 30lbs over the last month, mostly because he had stopped eating breakfast and lunch and mostly drank alcohol in the evenings. The patient denies manic symptoms, past and present.  The patient denies auditory or visual hallucinations, and no delusions could be elicited on direct questioning.  The patient denies current suicidal ideation, homicidal ideation, intent, or plan.     Pt's wife stated that at baseline patient is socially outgoing. Patient works fulltime, attends AA meetings 2x weekly and works with his sponsor daily. 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 Patient is without psychiatric complaints at baseline and is not engaged in outpatient psychiatric care. Pt's wife reports noticing a change in patient 1 month ago with suspicions of relapse after reaching 1 year of sobriety. Pt's wife was unable to confirm relapse until the night prior to admission, when patient was found at 4:30am incoherent in the backyard drinking.   Pt had no prior hx of suicidality, suicidal attempts or self- injurious behavior in the past.

## 2019-05-10 NOTE — DISCHARGE NOTE BEHAVIORAL HEALTH - NSBHDCTHERAPYFT_PSY_A_CORE
Pt participated in both group and individual therapies and was able to  verbalize his thoughts well.

## 2019-05-10 NOTE — DISCHARGE NOTE BEHAVIORAL HEALTH - NSBHDCPLANTRANSMIT_PSY_A_CORE
Marleen [place], [facility] [place] phone [phone], fax [phone]/ (specify name)/fax

## 2019-05-10 NOTE — DISCHARGE NOTE BEHAVIORAL HEALTH - NSBHDCHOUSINGFT_PSY_A_CORE
Patient is being discharged directly to [facility] ([facility] located at [address] [place] [address], phone [phone], fax [phone]. Upon discharge from [facility], patient will return to his home at [address] [place] [address].

## 2019-05-10 NOTE — DISCHARGE NOTE BEHAVIORAL HEALTH - NSBHDCSUBSTHXFT_PSY_A_CORE
PT admits heavy alcohol use in the past but denies any other substance use. Pt admits heavy alcohol use in the past but denies any other substance use.

## 2019-05-10 NOTE — DISCHARGE NOTE BEHAVIORAL HEALTH - NSBHDCALCOHOLREFERFT_PSY_A_CORE
[place] [facility] [place].

## 2019-05-10 NOTE — DISCHARGE NOTE BEHAVIORAL HEALTH - DISCHARGE TO
[facility] [place] at [address] [place] [address], phone [phone], fax [phone]

## 2019-05-10 NOTE — DISCHARGE NOTE BEHAVIORAL HEALTH - NSBHDCHANDOFFFT_PSY_A_CORE
Social work spoke w/ Kam in [place] [facility] phone [phone] ext. [phone], who accepted this patient into their inpatient substance use treatment program w/ a start of care date on Monday, 05/13/2019. Social work also provided clinical information via fax to [phone], per request from admissions rep.

## 2019-05-10 NOTE — DISCHARGE NOTE BEHAVIORAL HEALTH - PAST PSYCHIATRIC HISTORY
Pt had become paranoid when visiting his daughter who is an RN in [place] and would not allow her to book his flight online for fears of the computer "being bugged."

## 2019-05-10 NOTE — DISCHARGE NOTE BEHAVIORAL HEALTH - NSBHDCRESPONSEFT_PSY_A_CORE
Pt's paranoia and depressed mood has gradually resolved and he reports feeling much better and relieved of his distress.

## 2019-05-10 NOTE — PROGRESS NOTE BEHAVIORAL HEALTH - SUMMARY
Patient is a 58 year old  male; domiciled with wife and 2 adult children; noncaregiver; full time  ; no formal PPH ; no prior hospitalizations; no known suicide attempts; no known history of violence or arrests; active ETOH abuse, no known history of complicated withdrawal; no known PMH; brought in by AA sponsor after revealed to him that he had written a suicide note and had suicidal plan and intent to shoot himself with his shotgun.  Patient reports worsening anxiety and depression over the last month, came very close to a suicide attempt prior to admission.    He recently started to suspect that one of his coworkers was having a relationship with his ex-girlfriend, though he has no evidence for this.  He became paranoid that this coworker audio-taped him during a visit to a bar when he was confessing that he had the affair.  He also thought his ex-girl friend taped him during their last meeting at a restaurant.   Patient reports that he really "freaked out" when he realized that a photo he had been keeping of her in a drawer was missing and he began to feel very worried some one stole it and would show it to his boss at work and this would cause him to lose his job. He then began to drink and wrote a suicide note, had his loaded shotgun next to him. Patient is currently at increased risk of danger to himself and requires inpt psychiatric hospitalization for stabilization at this time. patient signed voluntarily to [facility]. He understands that his wife was contacted to be sure that he no longer has a gun.  He agrees that the writer may also call his wife at [phone].   His wife stated that his guns had been removed from their home and he would not have any way of taking them back.   Pt is tolerating the higher dose of Seroquel and continues the Trazodone, Wellbutrin.  Impression MDD severe with paranoia.  Will continue Seroquel 100mg q 9pm  Trazodone 50mg q 9pm  Will begin Wellbutrin XR 150mg daily for depression and help with quitting smoking and drinking.

## 2019-05-10 NOTE — DISCHARGE NOTE BEHAVIORAL HEALTH - NSBHDCSUICPREVNU_PSY_A_CORE
Suture Removal note:  CC: 61 y.o. male patient is here for suture removal.         HPI: Patient is s/p punch of DLE vs. vitiligo from the left scalp on 9/8/2017.  Patient reports no problems.    WOUND PE:  Sutures intact.  Wound healing well.  Good approximation of skin edges.  No signs or symptoms of infection.    IMPRESSION:  Skin, left scalp, punch biopsy:  - FEATURES CONSISTENT WITH VITILIGO.  MICROSCOPIC DESCRIPTION: Sections show a punch biopsy of skin extending to the level of the subcutis. The  epidermis is unremarkable. The superficial dermis contains a sparse perivascular lymphohistiocytic infiltrate. Mart  1 immunohistochemical stain reveals a complete absence of melanocytes along the dermoepidermal junction.  Appropriately reactive controls were reviewed. - margins clear.    PLAN:  Sutures removed today.  Continue wound care.    RTC: In 3 months or sooner if concerns arise.    
1 [phone]

## 2019-05-10 NOTE — DISCHARGE NOTE BEHAVIORAL HEALTH - MEDICATION SUMMARY - MEDICATIONS TO TAKE
I will START or STAY ON the medications listed below when I get home from the hospital:    acetaminophen 325 mg oral tablet  -- 2 tab(s) by mouth every 6 hours, As needed, Temp greater or equal to 38C (100.4F), Moderate Pain (4 - 6)  -- Indication: For No significant past surgical history    traZODone 50 mg oral tablet  -- 1 tab(s) by mouth once a day (at bedtime) x 30 days MDD:50 insomnia  -- Indication: For Current severe episode of major depressive disorder without psychotic features without prior episode    QUEtiapine 100 mg oral tablet  -- 1 tab(s) by mouth once a day (at bedtime) x 30 days MDD:100mg   -- Indication: For Current severe episode of major depressive disorder without psychotic features without prior episode    nicotine 21 mg/24 hr transdermal film, extended release  --  by transdermal patch   -- Indication: For No significant past surgical history    buPROPion 150 mg/24 hours (XL) oral tablet, extended release  -- 1 tab(s) by mouth once a day x 30 days MDD:150  Depression  -- Indication: For No significant past surgical history    Multiple Vitamins oral tablet  -- 1 tab(s) by mouth once a day x 30 days MDD:1tab   vit def.  -- Indication: For No significant past surgical history    folic acid 1 mg oral tablet  -- 1 tab(s) by mouth once a day x 30 days MDD:1mg    -- Indication: For No significant past surgical history    thiamine 100 mg oral tablet  -- 1 tab(s) by mouth once a day MDD:100mg  vit def.  -- Indication: For No significant past surgical history

## 2019-05-11 RX ADMIN — BUPROPION HYDROCHLORIDE 150 MILLIGRAM(S): 150 TABLET, EXTENDED RELEASE ORAL at 08:34

## 2019-05-11 RX ADMIN — Medication 50 MILLIGRAM(S): at 21:45

## 2019-05-11 RX ADMIN — Medication 100 MILLIGRAM(S): at 08:34

## 2019-05-11 RX ADMIN — Medication 1 PATCH: at 18:51

## 2019-05-11 RX ADMIN — Medication 1 TABLET(S): at 08:34

## 2019-05-11 RX ADMIN — QUETIAPINE FUMARATE 100 MILLIGRAM(S): 200 TABLET, FILM COATED ORAL at 21:45

## 2019-05-11 RX ADMIN — Medication 1 PATCH: at 08:34

## 2019-05-11 RX ADMIN — Medication 1 PATCH: at 08:35

## 2019-05-11 RX ADMIN — Medication 1 MILLIGRAM(S): at 08:34

## 2019-05-12 VITALS
TEMPERATURE: 98 F | SYSTOLIC BLOOD PRESSURE: 124 MMHG | RESPIRATION RATE: 15 BRPM | HEART RATE: 68 BPM | DIASTOLIC BLOOD PRESSURE: 70 MMHG

## 2019-05-12 RX ADMIN — Medication 1 PATCH: at 18:31

## 2019-05-12 RX ADMIN — QUETIAPINE FUMARATE 100 MILLIGRAM(S): 200 TABLET, FILM COATED ORAL at 21:39

## 2019-05-12 RX ADMIN — Medication 1 PATCH: at 08:38

## 2019-05-12 RX ADMIN — Medication 50 MILLIGRAM(S): at 21:39

## 2019-05-12 RX ADMIN — BUPROPION HYDROCHLORIDE 150 MILLIGRAM(S): 150 TABLET, EXTENDED RELEASE ORAL at 08:36

## 2019-05-12 RX ADMIN — Medication 1 TABLET(S): at 08:36

## 2019-05-12 RX ADMIN — Medication 1 MILLIGRAM(S): at 08:36

## 2019-05-12 RX ADMIN — Medication 100 MILLIGRAM(S): at 08:36

## 2019-05-12 RX ADMIN — Medication 1 PATCH: at 08:39

## 2019-05-13 PROCEDURE — 36415 COLL VENOUS BLD VENIPUNCTURE: CPT

## 2019-05-13 PROCEDURE — 93005 ELECTROCARDIOGRAM TRACING: CPT

## 2019-05-13 PROCEDURE — 99285 EMERGENCY DEPT VISIT HI MDM: CPT | Mod: 25

## 2019-05-13 PROCEDURE — 83036 HEMOGLOBIN GLYCOSYLATED A1C: CPT

## 2019-05-13 PROCEDURE — 71046 X-RAY EXAM CHEST 2 VIEWS: CPT

## 2019-05-13 PROCEDURE — 84443 ASSAY THYROID STIM HORMONE: CPT

## 2019-05-13 PROCEDURE — 99231 SBSQ HOSP IP/OBS SF/LOW 25: CPT

## 2019-05-13 PROCEDURE — 86780 TREPONEMA PALLIDUM: CPT

## 2019-05-13 PROCEDURE — 81001 URINALYSIS AUTO W/SCOPE: CPT

## 2019-05-13 PROCEDURE — 80053 COMPREHEN METABOLIC PANEL: CPT

## 2019-05-13 PROCEDURE — 85027 COMPLETE CBC AUTOMATED: CPT

## 2019-05-13 PROCEDURE — 80307 DRUG TEST PRSMV CHEM ANLYZR: CPT

## 2019-05-13 PROCEDURE — 80061 LIPID PANEL: CPT

## 2019-05-13 RX ORDER — FOLIC ACID 0.8 MG
1 TABLET ORAL
Qty: 30 | Refills: 3
Start: 2019-05-13 | End: 2019-09-09

## 2019-05-13 RX ORDER — QUETIAPINE FUMARATE 200 MG/1
1 TABLET, FILM COATED ORAL
Qty: 30 | Refills: 0
Start: 2019-05-13 | End: 2019-06-11

## 2019-05-13 RX ORDER — TRAZODONE HCL 50 MG
1 TABLET ORAL
Qty: 30 | Refills: 0
Start: 2019-05-13 | End: 2019-06-11

## 2019-05-13 RX ORDER — NICOTINE POLACRILEX 2 MG
21 GUM BUCCAL
Qty: 30 | Refills: 0
Start: 2019-05-13 | End: 2019-06-11

## 2019-05-13 RX ORDER — THIAMINE MONONITRATE (VIT B1) 100 MG
1 TABLET ORAL
Qty: 30 | Refills: 3
Start: 2019-05-13 | End: 2019-09-09

## 2019-05-13 RX ORDER — BUPROPION HYDROCHLORIDE 150 MG/1
1 TABLET, EXTENDED RELEASE ORAL
Qty: 30 | Refills: 0
Start: 2019-05-13 | End: 2019-06-11

## 2019-05-13 RX ADMIN — Medication 1 PATCH: at 08:38

## 2019-05-13 RX ADMIN — Medication 100 MILLIGRAM(S): at 08:38

## 2019-05-13 RX ADMIN — Medication 1 MILLIGRAM(S): at 08:38

## 2019-05-13 RX ADMIN — Medication 1 TABLET(S): at 08:38

## 2019-05-13 RX ADMIN — Medication 1 PATCH: at 08:40

## 2019-05-13 RX ADMIN — BUPROPION HYDROCHLORIDE 150 MILLIGRAM(S): 150 TABLET, EXTENDED RELEASE ORAL at 10:59

## 2019-05-13 NOTE — PROGRESS NOTE BEHAVIORAL HEALTH - NSBHFUPINTERVALHXFT_PSY_A_CORE
Patient was interviewed and chart was reviewed. Patient reports increased life stressors over the last month with excessive worry, low mood, low energy, paranoia, trouble sleeping over the last few weeks.  He says that his neighbor who is like a brother to him, now has his gun and will "get rid of it."  He understands that his wife was contacted to be sure that he no longer has a gun.  He agrees that the writer may also call his wife at [phone].  However he has not told her anything about why he is feeling guilty and does not want to reveal to her that he had an affair with a woman who has met his wife in the past.  He is still worried that the FBI may have "bugged" his cell phone and is fearful of using his cell phone to call his  to ask him if he has committed a crime by driving by the school where his ex-girlfriend works  to see if her car was parked there.  He agrees to increase the Seroquel to help with his paranoia and preoccupations.   Pt agrees that the Seroquel is helping him feel less worried and paranoid and was able to discuss risks and benefits. He is asking about alcohol rehabs and wants to stop drinking and smoking.
Pt continues to be very motivated to go to an inpatient rehab and says that he is more comfortable and much less preoccupied with thoughts.  He says he did have some trouble sleeping, and was reassured that the combination of Trazodone and Seroquel should help. He has had no thoughts of harming himself or others during his inpatient stay.   He was looking forward to going to rehab and thanked us for helping him.
Pt met with his daughter, Nicole who is a nurse in [place], and his  to discuss plans for alcohol rehab.  He is very motivated to go to an inpatient rehab and says that he is more comfortable though still preoccupied with thoughts that the FBI may be after him.  His daughter talked about her concerns that he appeared very paranoid when he visited her in [place] recently.  She wanted to know which rehab may accept him . His wife was contacted also while meeting with him. She also wanted him to attend an inpatient rehab for alcohol treatment.
Pt says that he met with his  friend who came to visit him and reassured him that he was not an actual stalker and just driving by and checking if a car if parked is not any crime.  He is tolerating the higher dose of Seroquel but requests to take it a little later at night. He continues to be very motivated to go to an inpatient rehab and says that he is more comfortable and much less preoccupied with thoughts that the FBI may be after him.
Thus far adjusting to the Unit. Patient reports increased life stressors over the last month with excessive worry, low mood, low energy, trouble sleeping over the last few weeks. He states that he has no appetite and has lost 10-20lbs over the last month. (ie. work pressures; some paranoia towards the  woman with whom he has been having an extra-marital affair for almost 14 years; he suggested that they break up and she did not object so they ended their relationship a few weeks ago. Patient reports that yesterday he realized that a photo he had been keeping of her in a drawer was missing and he began to feel very worried that someone knew about their relationship. He then began to drink and wrote a suicide note, had his loaded shotgun next to him. He states that he must have fallen asleep due to the alcohol because he woke up in the morning and was happy that he did not act on it. ) BAL 23, UTOX negative in ED.
Patient was interviewed and chart was reviewed. Patient reports increased life stressors over the last month with excessive worry, low mood, low energy, paranoia, trouble sleeping over the last few weeks mainly because he is fearful that he may be charged with "stalking" his ex-girl friend with whom he had an extra marital affair for the past 14 years. He suggested that they break up and she did not object so they ended their relationship a few weeks ago. He then drove by her work place several times (by the school where she works),  to see if her car was still parked there. He never went in or saw her in person, but feels guilty that he drove by to check the location of her car.  He then started to suspect that one of his coworkers was having a relationship with her, though he has no evidence for this.  He became paranoid that this coworker audio-taped him during a visit to a bar when he was confessing that he had the affair.  He also thought his ex-girl friend taped him during their last meeting at a restaurant.   Patient reports that he really "freaked out" when he realized that a partially naked photo he had been keeping of her in a drawer was missing and he began to feel very worried some one stole it and would show it to his boss at work and this would cause him to lose his job. He then began to drink and wrote a suicide note, had his loaded shotgun next to him. He states that he must have fallen asleep due to the alcohol because he woke up in the morning and was happy that he did not act on it. ) BAL 23, UTOX negative in ED.  Pt agrees to try a low dose of Seroquel to help him feel less worried and paranoid and was able to discuss risks and benefits.
Patient was interviewed and chart was reviewed. Patient reports increased life stressors over the last month with excessive worry, low mood, low energy, paranoia, trouble sleeping over the last few weeks.  He says that his neighbor who is like a brother to him, now has his gun and will "get rid of it."  He understands that his wife was contacted to be sure that he no longer has a gun.  He agrees that the writer may also call his wife at [phone].  Pt agrees that the Seroquel is helping him feel less worried and paranoid and was able to discuss risks and benefits. He is asking about alcohol rehabs and wants to stop drinking and smoking.
Pt continues to be very motivated to go to an inpatient rehab and says that he is more comfortable though still slightly preoccupied with thoughts that the FBI may be after him.  His wife also wanted him to attend an inpatient rehab for alcohol treatment.  His wife stated that his guns had been removed from their home and he would not have any way of taking them back.   Pt agrees to try a higher dose of Seroquel for his paranoia and will begin Wellbutrin in the am to help with his depression.

## 2019-05-13 NOTE — PROGRESS NOTE BEHAVIORAL HEALTH - NSBHCHARTREVIEWIMAGING_PSY_A_CORE FT
EXAM:  CT BRAIN                            PROCEDURE DATE:  05/03/2019          INTERPRETATION:  CLINICAL STATEMENT: Pain.    TECHNIQUE: CT of the head was performed without IV contrast.    COMPARISON: None.    FINDINGS:  There is no acute intracranial hemorrhage, parenchymal mass, mass effect   or midline shift. There is no acute territorial infarct. There is no   hydrocephalus.    The cranium is intact.         Mucosal thickening paranasal sinuses    IMPRESSION:  No acute intracranial hemorrhage or acute territorial infarct.
CT head normal
EXAM:  CT BRAIN                            PROCEDURE DATE:  05/03/2019          INTERPRETATION:  CLINICAL STATEMENT: Pain.    TECHNIQUE: CT of the head was performed without IV contrast.    COMPARISON: None.    FINDINGS:  There is no acute intracranial hemorrhage, parenchymal mass, mass effect   or midline shift. There is no acute territorial infarct. There is no   hydrocephalus.    The cranium is intact.         Mucosal thickening paranasal sinuses    IMPRESSION:  No acute intracranial hemorrhage or acute territorial infarct.
EXAM:  CT BRAIN                            PROCEDURE DATE:  05/03/2019          INTERPRETATION:  CLINICAL STATEMENT: Pain.    TECHNIQUE: CT of the head was performed without IV contrast.    COMPARISON: None.    FINDINGS:  There is no acute intracranial hemorrhage, parenchymal mass, mass effect   or midline shift. There is no acute territorial infarct. There is no   hydrocephalus.    The cranium is intact.         Mucosal thickening paranasal sinuses    IMPRESSION:  No acute intracranial hemorrhage or acute territorial infarct.
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
## 2019-05-13 NOTE — PROGRESS NOTE BEHAVIORAL HEALTH - NSBHADMITIPBHPROVIDER_PSY_A_CORE
does not have one/N/A
N/A/does not have one
N/A/does not have one
does not have one/N/A
N/A/does not have one
does not have one/N/A
does not have one/N/A
N/A/does not have one

## 2019-05-13 NOTE — PROGRESS NOTE BEHAVIORAL HEALTH - NSBHADMITDANGERSELF_PSY_A_CORE
suicidal behavior
suicidal behavior
suicidal ideation with plan and means
suicidal ideation with plan and means
suicidal behavior

## 2019-05-13 NOTE — PROGRESS NOTE BEHAVIORAL HEALTH - NSBHFUPINTERVALCCFT_PSY_A_CORE
"I didn't want to use my cell phone because."
"I don't have a gun anymore and I would not harm myself."
"I feel a little better."
"I feel a little better."
"I feel a much better."
"I'm afraid that I may be charged with stalking and I'm afraid I'm being recorded and framed."
"I feel a much better."

## 2019-05-13 NOTE — PROGRESS NOTE BEHAVIORAL HEALTH - NSBHCHARTREVIEWVS_PSY_A_CORE FT
Vital Signs Last 24 Hrs  T(C): 36.7 (12 May 2019 16:03), Max: 36.7 (12 May 2019 16:03)  T(F): 98 (12 May 2019 16:03), Max: 98 (12 May 2019 16:03)  HR: 68 (12 May 2019 16:03) (68 - 68)  BP: 124/70 (12 May 2019 16:03) (124/70 - 124/70)  BP(mean): --  RR: 15 (12 May 2019 16:03) (15 - 15)  SpO2: --

## 2019-05-13 NOTE — PROGRESS NOTE BEHAVIORAL HEALTH - NSBHCHARTREVIEWLAB_PSY_A_CORE FT
CBC, BMP, UA, UTOX unremarkable

## 2019-05-13 NOTE — PROGRESS NOTE BEHAVIORAL HEALTH - SECONDARY DX1
Alcohol abuse

## 2019-05-13 NOTE — PROGRESS NOTE BEHAVIORAL HEALTH - NSBHFUPSUICINTERVAL_PSY_A_CORE
none known
[FreeTextEntry1] : PKD stable. Continue Lasix.\par Hemoglobin A1c is a bit low. I explained to him that it's okay for his sugars to be a little bit high. Continue glyburide.\par Cholesterol fairly well-controlled. Continue simvastatin.\par Hypertension controlled. Continue ramipril and metoprolol.\par Cont vit B12 supplementation.
none known
yes

## 2019-05-13 NOTE — PROGRESS NOTE BEHAVIORAL HEALTH - NSBHPTASSESSDT_PSY_A_CORE
04-May-2019
06-May-2019 18:03
07-May-2019 16:45
08-May-2019 16:16
09-May-2019 14:57
13-May-2019 11:07
05-May-2019 11:52
10-May-2019 16:38

## 2019-05-13 NOTE — PROGRESS NOTE BEHAVIORAL HEALTH - RISK ASSESSMENT
currently at high risk given suicidal ideation, writing a suicide note and taking steps in taking out the shotgun, relapse on alcohol with hx of alcohol abuse

## 2019-05-13 NOTE — PROGRESS NOTE BEHAVIORAL HEALTH - NSBHADMITIPOBSFT_PSY_A_CORE
has been calm on the unit

## 2019-09-16 NOTE — ED PROVIDER NOTE - DIAGNOSTIC INTERPRETATION
Notify patient of results and plan.  Send copy to PCP.    Pathology shows:  benign    Plan: reassurance < from: CT Head No Cont (05.03.19 @ 15:53) >  FINDINGS:There is no acute intracranial hemorrhage, parenchymal mass, mass effect or midline shift. There is no acute territorial infarct. There is no hydrocephalus.The cranium is intact.       Mucosal thickening paranasal sinusesIMPRESSION:No acute intracranial hemorrhage or acute territorial infarct.  < end of copied text >

## 2020-12-14 NOTE — PROGRESS NOTE BEHAVIORAL HEALTH - ORIENTED TO PERSON
[FreeTextEntry1] : 72 year old female with history of hypothyroidism  ( Hashimoto's ?) on replacement , Chronic ITP s/p rituxan X 4 (10/2019), on slow steroid taper - Had bruising and ecchymoses when tried to taper prednisone to 2.5 mg daily - Currently on prednisone 5 mg   every other day . Platelets ranging btw 70-80K\par \par Plan:\par - cbc reviewed today. Platelets are 84K \par - c/w with prednisone to 5mg every other day\par \par RTC in  6 months after she returns from [place]\par The patient was seen an examined by Dr Cade who agreed with the above plan
Yes

## 2021-08-23 NOTE — ED PROVIDER NOTE - CHPI ED SYMPTOMS POS
Additional Anesthesia Volume In Cc (Will Not Render If 0): 0 PARANOIA/SADNESS/ANXIETY/DEPRESSION/SUICIDAL

## 2021-10-25 NOTE — ED ADULT NURSE NOTE - CAS DISCH ACCOMP BY
- Ibuprofen 400mg every 6 hours for fever.  - Acetaminophen 500mg every 6 hours for fever.  - If unable to urinate, must return to ED.  - Please bring all documentation from your ED visit to any related future follow up appointment.  - Please call to schedule follow up appointment with your primary care physician within 24-48 hours for re-evaluation.   - Please seek immediate medical attention or return to the ED for any new/worsening, signs/symptoms, or concerns.    Feel better!      Fever in Children    WHAT YOU NEED TO KNOW:    What is a fever? A fever is an increase in your child's body temperature. Normal body temperature is 98.6°F (37°C). Fever is generally defined as greater than 100.4°F (38°C). A fever can be serious in young children.    What causes a fever in children? Fever is commonly caused by a viral infection. Your child's body uses a fever to help fight the virus. The cause of your child's fever may not be known.    What temperature is a fever in children?   •An ear or forehead temperature of 100.4°F (38°C) or higher      •An oral or pacifier temperature of 100°F (37.8°C) or higher      •An armpit temperature of 99°F (37.2°C) or higher      What is the best way to take my child's temperature? The following are guidelines based on a child's age. Ask your child's healthcare provider about the best way to take your child's temperature.  •If your baby is 3 months or younger, take the temperature in his or her armpit.      •If your child is 3 months to 5 years, use an electronic pacifier temperature, depending on his or her age. After age 6 months, you can also take an ear, armpit, or forehead temperature.      •If your child is 5 years or older, take an oral, ear, or forehead temperature.    How to Take a Temperature in Children         What other signs and symptoms may my child have?   •Chills, sweating, or shivering      •A rash      •Being more tired or fussy than usual      •Nausea and vomiting      •Not feeling hungry or thirsty      •A headache or body aches      How is the cause of a fever in children diagnosed? Your child's healthcare provider will ask when your child's fever began and how high it was. He or she will ask about other symptoms and examine your child for signs of a viral infection. The provider will feel your child's neck for lumps and listen to his or her heart and lungs. Tell the provider if your child recently had surgery or an infection. Tell him or her if your child has any medical conditions, such as diabetes. Tell your provider if your child has had recent contact with a sick person. He or she may ask for a list of your child's medications or immunization records. Your child may also need blood or urine tests to check for infection. Ask about other tests your child may need if blood and urine tests do not explain the cause of your child's fever.    How is a fever treated? Treatment will depend on what is causing your child's fever. The fever might go away on its own without treatment. If the fever continues, the following may help bring the fever down:  •Acetaminophen decreases pain and fever. It is available without a doctor's order. Ask how much to give your child and how often to give it. Follow directions. Read the labels of all other medicines your child uses to see if they also contain acetaminophen, or ask your child's doctor or pharmacist. Acetaminophen can cause liver damage if not taken correctly.      •NSAIDs, such as ibuprofen, help decrease swelling, pain, and fever. This medicine is available with or without a doctor's order. NSAIDs can cause stomach bleeding or kidney problems in certain people. If your child takes blood thinner medicine, always ask if NSAIDs are safe for him or her. Always read the medicine label and follow directions. Do not give these medicines to children under 6 months of age without direction from your child's healthcare provider.      •Do not give aspirin to children under 18 years of age. Your child could develop Reye syndrome if he takes aspirin. Reye syndrome can cause life-threatening brain and liver damage. Check your child's medicine labels for aspirin, salicylates, or oil of wintergreen.     Acetaminophen Dosage in Children       Ibuprophen Dosage in Children         How can I make my child more comfortable while he or she has a fever?   •Give your child more liquids as directed. A fever makes your child sweat. This can increase his or her risk for dehydration. Liquids can help prevent dehydration.?Help your child drink at least 6 to 8 eight-ounce cups of clear liquids each day. Give your child water, juice, or broth. Do not give sports drinks to babies or toddlers.      ?Ask your child's healthcare provider if you should give your child an oral rehydration solution (ORS) to drink. An ORS has the right amounts of water, salts, and sugar your child needs to replace body fluids.      ?If you are breastfeeding or feeding your child formula, continue to do so. Your baby may not feel like drinking his or her regular amounts with each feeding. If so, feed him or her smaller amounts more often.      •Dress your child in lightweight clothes. Shivers may be a sign that your child's fever is rising. Do not put extra blankets or clothes on him or her. This may cause his or her fever to rise even higher. Dress your child in light, comfortable clothing. Cover him or her with a lightweight blanket or sheet. Change your child's clothes, blanket, or sheets if they get wet.      •Cool your child safely. Use a cool compress or give your child a bath in cool or lukewarm water. Your child's fever may not go down right away after his or her bath. Wait 30 minutes and check his or her temperature again. Do not put your child in a cold water or ice bath.      When should I seek immediate care?   •Your child's temperature reaches 105°F (40.6°C).      •Your child has a dry mouth, cracked lips, or cries without tears.      •Your baby has a dry diaper for at least 8 hours, or he or she is urinating less than usual.      •Your child is less alert, less active, or is acting differently than he or she usually does.      •Your child has a seizure or has abnormal movements of the face, arms, or legs.      •Your child is drooling and not able to swallow.      •Your child has a stiff neck, severe headache, confusion, or is difficult to wake.      •Your child has a fever for longer than 5 days.      •Your child is crying or irritable and cannot be soothed.      When should I contact my child's healthcare provider?   •Your child's ear or forehead temperature is higher than 100.4°F (38°C).      •Your child's oral or pacifier temperature is higher than 100°F (37.8°C).      •Your child's armpit temperature is higher than 99°F (37.2°C).      •Your child's fever lasts longer than 3 days.      •You have questions or concerns about your child's fever.      CARE AGREEMENT:    You have the right to help plan your child's care. Learn about your child's health condition and how it may be treated. Discuss treatment options with your child's healthcare providers to decide what care you want for your child.      Hypokalemia      Hypokalemia means that the amount of potassium in the blood is lower than normal. Potassium is a chemical (electrolyte) that helps regulate the amount of fluid in the body. It also stimulates muscle tightening (contraction) and helps nerves work properly.    Normally, most of the body's potassium is inside cells, and only a very small amount is in the blood. Because the amount in the blood is so small, minor changes to potassium levels in the blood can be life-threatening.      What are the causes?  This condition may be caused by:  •Antibiotic medicine.      •Diarrhea or vomiting. Taking too much of a medicine that helps you have a bowel movement (laxative) can cause diarrhea and lead to hypokalemia.      •Chronic kidney disease (CKD).      •Medicines that help the body get rid of excess fluid (diuretics).      •Eating disorders, such as bulimia.      •Low magnesium levels in the body.      •Sweating a lot.        What are the signs or symptoms?  Symptoms of this condition include:  •Weakness.      •Constipation.      •Fatigue.      •Muscle cramps.      •Mental confusion.      •Skipped heartbeats or irregular heartbeat (palpitations).      •Tingling or numbness.        How is this diagnosed?    This condition is diagnosed with a blood test.      How is this treated?  This condition may be treated by:  •Taking potassium supplements by mouth.      •Adjusting the medicines that you take.      •Eating more foods that contain a lot of potassium.      If your potassium level is very low, you may need to get potassium through an IV and be monitored in the hospital.      Follow these instructions at home:     •Take over-the-counter and prescription medicines only as told by your health care provider. This includes vitamins and supplements.      •Eat a healthy diet. A healthy diet includes fresh fruits and vegetables, whole grains, healthy fats, and lean proteins.    •If instructed, eat more foods that contain a lot of potassium. This includes:  •Nuts, such as peanuts and pistachios.      •Seeds, such as sunflower seeds and pumpkin seeds.      •Peas, lentils, and lima beans.      •Whole grain and bran cereals and breads.      •Fresh fruits and vegetables, such as apricots, avocado, bananas, cantaloupe, kiwi, oranges, tomatoes, asparagus, and potatoes.      •Orange juice.      •Tomato juice.      •Red meats.      •Yogurt.        •Keep all follow-up visits as told by your health care provider. This is important.        Contact a health care provider if you:    •Have weakness that gets worse.      •Feel your heart pounding or racing.      •Vomit.      •Have diarrhea.      •Have diabetes (diabetes mellitus) and you have trouble keeping your blood sugar (glucose) in your target range.        Get help right away if you:    •Have chest pain.      •Have shortness of breath.      •Have vomiting or diarrhea that lasts for more than 2 days.      •Faint.        Summary    •Hypokalemia means that the amount of potassium in the blood is lower than normal.      •This condition is diagnosed with a blood test.      •Hypokalemia may be treated by taking potassium supplements, adjusting the medicines that you take, or eating more foods that are high in potassium.      •If your potassium level is very low, you may need to get potassium through an IV and be monitored in the hospital.      This information is not intended to replace advice given to you by your health care provider. Make sure you discuss any questions you have with your health care provider. Significant other/Caregiver/1:2/Transport

## 2021-11-01 ENCOUNTER — TRANSCRIPTION ENCOUNTER (OUTPATIENT)
Age: 61
End: 2021-11-01

## 2022-02-18 NOTE — PROGRESS NOTE BEHAVIORAL HEALTH - NS ED BHA REVIEW OF ED CHART AVAILABLE INVESTIGATIONS REVIEWED
Yes
Assistance OOB with selected safe patient handling equipment/Assistance with ambulation/Communicate Fall Risk and Risk Factors to all staff, patient, and family/Discuss with provider need for PT consult/Monitor gait and stability/Provide patient with walking aids - walker, cane, crutches/Reinforce activity limits and safety measures with patient and family/Visual Cue: Yellow wristband/Bed in lowest position, wheels locked, appropriate side rails in place/Call bell, personal items and telephone in reach/Instruct patient to call for assistance before getting out of bed or chair/Non-slip footwear when patient is out of bed/[place] to call system/Physically safe environment - no spills, clutter or unnecessary equipment/Purposeful Proactive Rounding/Room/bathroom lighting operational, light cord in reach
Yes

## 2022-07-08 NOTE — ED ADULT NURSE NOTE - NS_BH TRG QUESTION5_ED_ALL_ED
ADDICTION MEDICINE FOLLOW UP VISIT    Patient: Fan Franklin Consultant: Dr. Destin Dorsey MD   : 1989 Age: 33 year old         Chief Complaint   Patient presents with   • Drug Problem       Patient has given consent to record this visit for documentation in their clinical record.      History of Present Illness:    Opioid type dependence, continuous (CMS/HCC) (primary encounter diagnosis):  On Buprenorphine HCL-Naloxone 2.5 tablets daily  - this seems to be working ok but increased craving for heroin.  Has not used in great than 5 years.  Is injecting and this is a problem in his relationship - asking for help with this.     Current Medications:  Current Outpatient Medications   Medication Sig Dispense Refill   • Buprenorphine HCl-Naloxone HCl 1.4-0.36 MG SL Tab Place 3 tablets under the tongue daily. 70 tablet 0   • gabapentin (NEURONTIN) 100 MG capsule TAKE 2 CAPSULES BY MOUTH EVERY NIGHT 60 capsule 2   • sodium fluoride 1.1 % dental gel      • cloNIDine (CATAPRES) 0.2 MG tablet Take 1 tablet by mouth at bedtime. 90 tablet 3   • betamethasone valerate (VALISONE) 0.1 % lotion Apply topically 2 times daily. 60 mL 3   • ondansetron (ZOFRAN) 4 MG tablet Take 1 tablet by mouth every 8 hours as needed for Nausea. 9 tablet 0   • albuterol 108 (90 Base) MCG/ACT inhaler Inhale 1 puff into the lungs every 4 hours as needed for Shortness of Breath or Wheezing. 1 Inhaler 3   • ibuprofen (MOTRIN) 200 MG tablet Take 200 mg by mouth every 6 hours as needed.       No current facility-administered medications for this visit.       ALLERGIES:  ALLERGIES:  No Known Allergies     Review of Systems:    Constitutional: Per HPI  Neurologic: Per HPI   Psychiatric: Per HPI     Vitals:    22 0900   BP: 102/70     Physical Exam:    Constitutional: In no acute distress. Well developed   Eyes: The conjunctiva exhibited no abnormalities. The sclera was normal   Psychiatric: Oriented to time, place, and person. The  mood was normal. The affect was normal. The memory was unimpaired.   Skin: Skin moisture and turgor normal.      Behavioral Health on 07/08/2022   Component Date Value Ref Range Status   • POCT CANNABINOIDS 07/08/2022 Positive (A) Negative Final   • POCT COCAINE 07/08/2022 Negative  Negative Final   • POCT MORPHINE 07/08/2022 Negative  Negative Final   • POCT Opiates 07/08/2022 Negative  Negative Final   • POCT METHAMPHETAMINE 07/08/2022 Negative  Negative Final   • POCT AMPHETAMINE 07/08/2022 Negative  Negative Final   • POCT BENZODIZEPINE 07/08/2022 Negative  Negative Final   • POCT BARBITURATES 07/08/2022 Negative  Negative Final   • POCT METHADONE 07/08/2022 Negative  Negative Final   • POCT BUPRENORPHINE 07/08/2022 Positive (A) Negative Final   • POCT TCA 07/08/2022 Negative  Negative Final   • POCT MDMA 07/08/2022 Negative  Negative Final   • POCT OXYCODONE 07/08/2022 Negative  Negative Final   • POCT PCP 07/08/2022 Negative  Negative Final   • TEMPERATURE, POC 07/08/2022 0   Final   • POCT Specific Gravity 07/08/2022 1.030  1.005 - 1.030 Final       Assessment and Plan:    Opioid type dependence, continuous (CMS/HCC)  (primary encounter diagnosis)    Orders Placed This Encounter   • POCT MULTIPLE DRUG SCREEN, IN-OFFICE   • Buprenorphine HCl-Naloxone HCl 1.4-0.36 MG SL Tab     Opioid type dependence, continuous (CMS/HCC) (primary encounter diagnosis):  Ordered POCT multiple drug screen, in-office. Reviewed.   Continue Buprenorphine HCL-Naloxone current medication as directed - increased related to going on vacation and increase craving to use Refill provided.    Also provided information related to recovery coaching.   Also discussed and introduced PIESS inventory.    4 week follow up recommended    Destin Dorsey MD  [facility] [id number]    Refer to orders.  Medical compliance with plan discussed and risks of non-compliance reviewed.  Patient education completed on disease process, etiology &  prognosis.  Proper usage and side effects of medications reviewed & discussed.  Patient understands and agrees with the plan.  Return to clinic as clinically indicated as discussed with patient who verbalized understanding of the plan and is in agreement with the plan.           No

## 2022-07-12 NOTE — PATIENT PROFILE BEHAVIORAL HEALTH - HOW PATIENT ADDRESSED, PROFILE
Case Management Assessment & Discharge Planning Note    Patient name Lachelle Amin  Location [address] 9[address]/[facility] 952-57 MRN [id number]  : 1945 Date 2022       Current Admission Date: 2022  Current Admission Diagnosis:Dysphagia   Patient Active Problem List    Diagnosis Date Noted    Dysphagia 2022    Pancytopenia ([facility] ) 2022    Acute gastric ulcer 2022    Esophageal adenocarcinoma ([place] [address] ) 2022    Abdominal aortic aneurysm, without rupture ([place] [address] ) 02/10/2022    Paroxysmal atrial fibrillation ([place] [address] ) 02/10/2022    Coronary artery disease of native artery of native heart with stable angina pectoris ([place] [address] ) 02/10/2022    Ischemic cardiomyopathy 2021    Chronic combined systolic and diastolic CHF (congestive heart failure) ([place] [address] ) 2021    Allergic reaction to contrast media 2021    NSTEMI (non-ST elevated myocardial infarction) ([place] [address] ) 2021    Abdominal pain 2020    Hernia, incisional 2019    Ventral hernia with obstruction and without gangrene 2019    CAD (coronary artery disease) 2017    HTN (hypertension) 2017    HLD (hyperlipidemia) 2017    Seizure disorder ([place] [address] ) 2017    S/P AAA (abdominal aortic aneurysm) repair 2017    STEMI (ST elevation myocardial infarction) ([place] [address] ) 2017      LOS (days): 1  Geometric Mean LOS (GMLOS) (days): 3 60  Days to GMLOS:2 7     OBJECTIVE:    Risk of Unplanned Readmission Score: 20 4         Current admission status: Inpatient       Preferred Pharmacy:   [id number]  [address] [place] [address]  Phone: [phone] [facility] [address] [facility] [address] [place] [address] [address] [facility]  Phone: [phone] Fax: [phone]    Primary Care Provider: Misha Lee MD    Primary Insurance: MEDICARE  Secondary Insurance: [place] [facility]    ASSESSMENT:  Active Health Care Proxies    There are no active Health Care Proxies on file  Patient Information  Admitted from[de-identified] Home  Mental Status: Alert  During Assessment patient was accompanied by: Not accompanied during assessment  Assessment information provided by[de-identified] Patient  Primary Caregiver: Self  Support Systems: Self, Family members  What city do you live in?: [address] entry access options   Select all that apply : Stairs  Number of steps to enter home : 2  Do the steps have railings?: No  Type of Current Residence: Apartment  Floor Level: 1  Upon entering residence, is there a bedroom on the main floor (no further steps)?: Yes  Upon entering residence, is there a bathroom on the main floor (no further steps)?: Yes  In the last 12 months, was there a time when you were not able to pay the mortgage or rent on time?: No  In the last 12 months, how many places have you lived?: 1  In the last 12 months, was there a time when you did not have a steady place to sleep or slept in a shelter (including now)?: No  Living Arrangements: Other (Comment) (lives with his brother)    Activities of Daily Living Prior to Admission  Functional Status: Independent  Completes ADLs independently?: Yes  Ambulates independently?: Yes  Does patient use assisted devices?: No  Does patient currently own DME?: Yes  What DME does the patient currently own?: [employer]  Does patient have a history of Outpatient Therapy (PT/OT)?: No  Does the patient have a history of Short-Term Rehab?: No  Does patient have a history of HHC?: No  Does patient currently have [facility]?: No         Patient Information Continued  Income Source: Pension/detention  Does patient have prescription coverage?: Yes  Within the past 12 months, you worried that your food would run out before you got the money to buy more : Never true  Within the past 12 months, the food you bought just didn't last and you didn't have money to get more : Never true  Does patient receive dialysis treatments?: No  Does patient have a history of substance abuse?: No  Does patient have a history of Mental Health Diagnosis?: No         Means of Transportation  Means of Transport to Appts[de-identified] Family transport (states that transport is provided by his brother)  In the past 12 months, has lack of transportation kept you from medical appointments or from getting medications?: No  In the past 12 months, has lack of transportation kept you from meetings, work, or from getting things needed for daily living?: No        DISCHARGE DETAILS:    Discharge planning discussed with[de-identified] patient  Freedom of Choice: Yes        Were Treatment Team discharge recommendations reviewed with patient/caregiver?: Yes  Did patient/caregiver verbalize understanding of patient care needs?: Yes  Were patient/caregiver advised of the risks associated with not following Treatment Team discharge recommendations?: Yes                   Other Referral/Resources/Interventions Provided:  Referral Comments: spoke with both PT/OT who stated that the nita has no rehab needs for time of discharge  Additional Comments: spoke with the patient, he confirms that he is not COVID vaccinated at this time  CM also received consult for the patient regarding social issues  Patient however at this time is denying any issues at this time  CM reviewed d/c planning process including the following: identifying help at home, patient preference for d/c planning needs, Discharge Lounge, [facility] Meds to Bed program, availability of treatment team to discuss questions or concerns patient and/or family may have regarding understanding medications and recognizing signs and symptoms once discharged  CM also encouraged patient to follow up with all recommended appointments after discharge   Patient advised of importance for patient and family to participate in managing patients medical well being  Patient/caregiver received discharge checklist   Content reviewed  Patient/caregiver encouraged to participate in discharge plan of care prior to discharge home  [place]

## 2022-08-01 NOTE — ED BEHAVIORAL HEALTH ASSESSMENT NOTE - THOUGHT ASSOCIATIONS
This is a recent snapshot of the patient's [facility] medical record.  For current drug dose and complete information and questions, call [phone] or In Basket pool, [facility] home infusion ([id number])  CSN Number:  [id number]     Normal

## 2023-10-28 ENCOUNTER — NON-APPOINTMENT (OUTPATIENT)
Age: 63
End: 2023-10-28

## 2024-04-16 PROBLEM — F10.10 ALCOHOL ABUSE, UNCOMPLICATED: Chronic | Status: ACTIVE | Noted: 2019-05-03

## 2024-04-16 PROBLEM — Z00.00 ENCOUNTER FOR PREVENTIVE HEALTH EXAMINATION: Status: ACTIVE | Noted: 2024-04-16

## 2024-05-27 ENCOUNTER — NON-APPOINTMENT (OUTPATIENT)
Age: 64
End: 2024-05-27

## 2024-05-28 ENCOUNTER — TRANSCRIPTION ENCOUNTER (OUTPATIENT)
Age: 64
End: 2024-05-28

## 2024-05-28 ENCOUNTER — APPOINTMENT (OUTPATIENT)
Dept: GASTROENTEROLOGY | Facility: CLINIC | Age: 64
End: 2024-05-28
Payer: COMMERCIAL

## 2024-05-28 VITALS
DIASTOLIC BLOOD PRESSURE: 81 MMHG | SYSTOLIC BLOOD PRESSURE: 132 MMHG | HEART RATE: 96 BPM | BODY MASS INDEX: 28.1 KG/M2 | WEIGHT: 212 LBS | HEIGHT: 73 IN | OXYGEN SATURATION: 97 %

## 2024-05-28 DIAGNOSIS — K21.9 GASTRO-ESOPHAGEAL REFLUX DISEASE W/OUT ESOPHAGITIS: ICD-10-CM

## 2024-05-28 DIAGNOSIS — Z12.11 ENCOUNTER FOR SCREENING FOR MALIGNANT NEOPLASM OF COLON: ICD-10-CM

## 2024-05-28 PROCEDURE — 99203 OFFICE O/P NEW LOW 30 MIN: CPT

## 2024-05-28 RX ORDER — SODIUM SULFATE, POTASSIUM SULFATE AND MAGNESIUM SULFATE 1.6; 3.13; 17.5 G/177ML; G/177ML; G/177ML
17.5-3.13-1.6 SOLUTION ORAL
Qty: 1 | Refills: 0 | Status: ACTIVE | COMMUNITY
Start: 2024-05-28 | End: 1900-01-01

## 2024-05-30 NOTE — ASSESSMENT
[FreeTextEntry1] : 63M, here today for GERD and colon cancer screening.   # GERD  - continue prn H2 blocker (pepcid)  - avoid nsaids - lifestyle modifications reviewed  - schedule EGD for intraluminal eval   # Colon cancer screening - No fhx of colon polyps or colon cancer  - Cscope at age 49 - ?polyps. No report available - patient elects to proceed w cscope  - r/b/a reviewed  - prep sent to pharmacy on file   RTC after egd/colon

## 2024-05-30 NOTE — HISTORY OF PRESENT ILLNESS
[FreeTextEntry1] : 63M, referred for colon cancer screening.   Feels well overall.   Reports normal bowel movements daily  No blood in stool  No change in bowel habits   He has episodic heartburn, typically once a week or so  Pepcid helps with symptoms  Denies n/v, dysphagia, odynophagia, wt loss   EGD - 20 yrs ago - normal per patient. no report available  Colonoscopy - - at age 49 - did have polyps - no report available   No fhx of colon polyps, GI malignancy

## 2024-08-09 ENCOUNTER — TRANSCRIPTION ENCOUNTER (OUTPATIENT)
Age: 64
End: 2024-08-09

## 2024-08-09 ENCOUNTER — APPOINTMENT (OUTPATIENT)
Dept: GASTROENTEROLOGY | Facility: HOSPITAL | Age: 64
End: 2024-08-09

## 2024-08-09 ENCOUNTER — RESULT REVIEW (OUTPATIENT)
Age: 64
End: 2024-08-09

## 2024-08-09 ENCOUNTER — OUTPATIENT (OUTPATIENT)
Dept: OUTPATIENT SERVICES | Facility: HOSPITAL | Age: 64
LOS: 1 days | End: 2024-08-09
Payer: COMMERCIAL

## 2024-08-09 VITALS
SYSTOLIC BLOOD PRESSURE: 119 MMHG | OXYGEN SATURATION: 97 % | DIASTOLIC BLOOD PRESSURE: 72 MMHG | HEART RATE: 62 BPM | RESPIRATION RATE: 14 BRPM

## 2024-08-09 VITALS
SYSTOLIC BLOOD PRESSURE: 150 MMHG | DIASTOLIC BLOOD PRESSURE: 87 MMHG | OXYGEN SATURATION: 99 % | RESPIRATION RATE: 16 BRPM | HEART RATE: 80 BPM | TEMPERATURE: 97 F | WEIGHT: 214.95 LBS | HEIGHT: 73 IN

## 2024-08-09 DIAGNOSIS — K21.9 GASTRO-ESOPHAGEAL REFLUX DISEASE WITHOUT ESOPHAGITIS: ICD-10-CM

## 2024-08-09 DIAGNOSIS — Z12.11 ENCOUNTER FOR SCREENING FOR MALIGNANT NEOPLASM OF COLON: ICD-10-CM

## 2024-08-09 PROBLEM — K20.90 ESOPHAGITIS: Status: ACTIVE | Noted: 2024-08-09

## 2024-08-09 PROCEDURE — 45385 COLONOSCOPY W/LESION REMOVAL: CPT

## 2024-08-09 PROCEDURE — 88305 TISSUE EXAM BY PATHOLOGIST: CPT | Mod: 26

## 2024-08-09 PROCEDURE — 43239 EGD BIOPSY SINGLE/MULTIPLE: CPT

## 2024-08-09 PROCEDURE — 45385 COLONOSCOPY W/LESION REMOVAL: CPT | Mod: PT

## 2024-08-09 PROCEDURE — 88305 TISSUE EXAM BY PATHOLOGIST: CPT

## 2024-08-09 DEVICE — NET RETRV ROT ROTH 2.5MMX230CM: Type: IMPLANTABLE DEVICE | Status: FUNCTIONAL

## 2024-08-09 RX ORDER — BACTERIOSTATIC SODIUM CHLORIDE 0.9 %
500 VIAL (ML) INJECTION
Refills: 0 | Status: COMPLETED | OUTPATIENT
Start: 2024-08-09 | End: 2024-08-09

## 2024-08-09 RX ADMIN — Medication 30 MILLILITER(S): at 09:16

## 2024-08-09 NOTE — ASU PATIENT PROFILE, ADULT - FALL HARM RISK - UNIVERSAL INTERVENTIONS
Bed in lowest position, wheels locked, appropriate side rails in place/Call bell, personal items and telephone in reach/Instruct patient to call for assistance before getting out of bed or chair/Non-slip footwear when patient is out of bed/[place] to call system/Physically safe environment - no spills, clutter or unnecessary equipment/Purposeful Proactive Rounding/Room/bathroom lighting operational, light cord in reach

## 2024-08-09 NOTE — PRE-ANESTHESIA EVALUATION ADULT - NSANTHOSAYNRD_GEN_A_CORE
No. LUDMILA screening performed.  STOP BANG Legend: 0-2 = LOW Risk; 3-4 = INTERMEDIATE Risk; 5-8 = HIGH Risk

## 2024-08-09 NOTE — PRE PROCEDURE NOTE - PRE PROCEDURE EVALUATION
Attending Physician:  Cain Underwood                          Procedure: upper endoscopy, colonoscopy    Indication for Procedure: GERD, screening   ________________________________________________________  PAST MEDICAL & SURGICAL HISTORY:  ETOH abuse      No significant past surgical history        ALLERGIES:  No Known Allergies    HOME MEDICATIONS:  acetaminophen 325 mg oral tablet: 2 tab(s) orally every 6 hours, As needed, Temp greater or equal to 38C (100.4F), Moderate Pain (4 - 6)  amphetamine salts ER 20mg 2xday:   tamsulosin 4mg 1xdaily:     AICD/PPM: [ ] yes   [ x] no    PERTINENT LAB DATA:                      PHYSICAL EXAMINATION:    Height (cm): 185.4  Weight (kg): 97.5  BMI (kg/m2): 28.4  BSA (m2): 2.22T(C): 36.2  HR: 80  BP: 150/87  RR: 16  SpO2: 99%    Constitutional: NAD  HEENT: PERRLA, EOMI,    Neck:  No JVD  Respiratory: CTAB/L  Cardiovascular: S1 and S2  Gastrointestinal: BS+, soft, NT/ND  Extremities: No peripheral edema  Neurological: A/O x 3, no focal deficits  Psychiatric: Normal mood, normal affect  Skin: No rashes    ASA Class: I [ ]  II [ x]  III [ ]  IV [ ]    COMMENTS:    The patient is a suitable candidate for the planned procedure unless box checked [ ]  No, explain:

## 2024-08-12 LAB — SURGICAL PATHOLOGY STUDY: SIGNIFICANT CHANGE UP

## 2024-08-30 NOTE — ED BEHAVIORAL HEALTH ASSESSMENT NOTE - PERSONAL COLLATERAL PHONE
Physical Therapy      1445: Pt getting cleaned up by CNA, will follow up as schedule permits.     [phone]

## 2024-10-03 ENCOUNTER — APPOINTMENT (OUTPATIENT)
Dept: GASTROENTEROLOGY | Facility: CLINIC | Age: 64
End: 2024-10-03

## 2024-11-07 ENCOUNTER — APPOINTMENT (OUTPATIENT)
Dept: GASTROENTEROLOGY | Facility: CLINIC | Age: 64
End: 2024-11-07
Payer: COMMERCIAL

## 2024-11-07 VITALS
DIASTOLIC BLOOD PRESSURE: 87 MMHG | OXYGEN SATURATION: 94 % | HEIGHT: 73 IN | WEIGHT: 216 LBS | HEART RATE: 102 BPM | SYSTOLIC BLOOD PRESSURE: 140 MMHG | RESPIRATION RATE: 17 BRPM | BODY MASS INDEX: 28.63 KG/M2

## 2024-11-07 DIAGNOSIS — K20.90 ESOPHAGITIS, UNSPECIFIED WITHOUT BLEEDING: ICD-10-CM

## 2024-11-07 DIAGNOSIS — K21.9 GASTRO-ESOPHAGEAL REFLUX DISEASE W/OUT ESOPHAGITIS: ICD-10-CM

## 2024-11-07 PROCEDURE — 99213 OFFICE O/P EST LOW 20 MIN: CPT

## 2024-11-14 ENCOUNTER — NON-APPOINTMENT (OUTPATIENT)
Age: 64
End: 2024-11-14

## 2024-12-01 ENCOUNTER — NON-APPOINTMENT (OUTPATIENT)
Age: 64
End: 2024-12-01

## 2025-01-03 ENCOUNTER — TRANSCRIPTION ENCOUNTER (OUTPATIENT)
Age: 65
End: 2025-01-03

## 2025-01-03 ENCOUNTER — APPOINTMENT (OUTPATIENT)
Dept: GASTROENTEROLOGY | Facility: HOSPITAL | Age: 65
End: 2025-01-03

## 2025-01-03 ENCOUNTER — OUTPATIENT (OUTPATIENT)
Dept: OUTPATIENT SERVICES | Facility: HOSPITAL | Age: 65
LOS: 1 days | End: 2025-01-03
Payer: COMMERCIAL

## 2025-01-03 VITALS
DIASTOLIC BLOOD PRESSURE: 89 MMHG | HEART RATE: 75 BPM | RESPIRATION RATE: 17 BRPM | SYSTOLIC BLOOD PRESSURE: 141 MMHG | OXYGEN SATURATION: 98 %

## 2025-01-03 VITALS
RESPIRATION RATE: 17 BRPM | OXYGEN SATURATION: 97 % | WEIGHT: 214.95 LBS | HEART RATE: 80 BPM | SYSTOLIC BLOOD PRESSURE: 136 MMHG | HEIGHT: 73 IN | DIASTOLIC BLOOD PRESSURE: 90 MMHG | TEMPERATURE: 98 F

## 2025-01-03 DIAGNOSIS — K20.90 ESOPHAGITIS, UNSPECIFIED WITHOUT BLEEDING: ICD-10-CM

## 2025-01-03 PROCEDURE — 44360 SMALL BOWEL ENDOSCOPY: CPT

## 2025-01-03 PROCEDURE — 43235 EGD DIAGNOSTIC BRUSH WASH: CPT

## 2025-01-03 RX ORDER — METHYLPHENIDATE HYDROCHLORIDE 27 MG/1
1 TABLET, EXTENDED RELEASE ORAL
Refills: 0 | DISCHARGE

## 2025-01-03 RX ORDER — OMEPRAZOLE MAGNESIUM 20 MG/1
1 CAPSULE, DELAYED RELEASE ORAL
Refills: 0 | DISCHARGE

## 2025-01-03 RX ORDER — SODIUM CHLORIDE 9 MG/ML
500 INJECTION, SOLUTION INTRAMUSCULAR; INTRAVENOUS; SUBCUTANEOUS
Refills: 0 | Status: COMPLETED | OUTPATIENT
Start: 2025-01-03 | End: 2025-01-03

## 2025-01-03 RX ADMIN — SODIUM CHLORIDE 30 MILLILITER(S): 9 INJECTION, SOLUTION INTRAMUSCULAR; INTRAVENOUS; SUBCUTANEOUS at 09:00

## 2025-01-03 NOTE — ASU DISCHARGE PLAN (ADULT/PEDIATRIC) - FINANCIAL ASSISTANCE
[facility] provides services at a reduced cost to those who are determined to be eligible through [facility]’s financial assistance program. Information regarding [facility]’s financial assistance program can be found by going to [email or web address] or by calling [phone].

## 2025-01-03 NOTE — PRE PROCEDURE NOTE - PRE PROCEDURE EVALUATION
Attending Physician:    Cain Underwood                         Procedure: upper endoscopy     Indication for Procedure: follow up esophagitis (LA Grade C)   ________________________________________________________  PAST MEDICAL & SURGICAL HISTORY:  ETOH abuse      No significant past surgical history        ALLERGIES:  No Known Allergies    HOME MEDICATIONS:  acetaminophen 325 mg oral tablet: 2 tab(s) orally every 6 hours, As needed, Temp greater or equal to 38C (100.4F), Moderate Pain (4 - 6)  amphetamine salts ER 20mg 2xday:   Jornay PM 20 mg/24 hr oral capsule, extended release: 1 cap(s) orally once a day  omeprazole 20 mg oral delayed release tablet: 1 tab(s) orally 2 times a day  tamsulosin 4mg 1xdaily:     AICD/PPM: [ ] yes   [ x] no    PERTINENT LAB DATA:                      PHYSICAL EXAMINATION:    Height (cm): 185.4  Weight (kg): 97.5  BMI (kg/m2): 28.4  BSA (m2): 2.22T(C): 36.4  HR: 80  BP: 136/90  RR: 17  SpO2: 97%    Constitutional: NAD  HEENT: PERRLA, EOMI,    Neck:  No JVD  Respiratory: CTAB/L  Cardiovascular: S1 and S2  Gastrointestinal: BS+, soft, NT/ND  Extremities: No peripheral edema  Neurological: A/O x 3, no focal deficits  Psychiatric: Normal mood, normal affect  Skin: No rashes    ASA Class: I [ ]  II [x ]  III [ ]  IV [ ]    COMMENTS:    The patient is a suitable candidate for the planned procedure unless box checked [ ]  No, explain:

## 2025-02-21 ENCOUNTER — APPOINTMENT (OUTPATIENT)
Dept: ORTHOPEDIC SURGERY | Facility: CLINIC | Age: 65
End: 2025-02-21
Payer: COMMERCIAL

## 2025-02-21 VITALS — HEIGHT: 73 IN | WEIGHT: 216 LBS | BODY MASS INDEX: 28.63 KG/M2

## 2025-02-21 DIAGNOSIS — M25.461 EFFUSION, RIGHT KNEE: ICD-10-CM

## 2025-02-21 DIAGNOSIS — M23.91 UNSPECIFIED INTERNAL DERANGEMENT OF RIGHT KNEE: ICD-10-CM

## 2025-02-21 DIAGNOSIS — M17.11 UNILATERAL PRIMARY OSTEOARTHRITIS, RIGHT KNEE: ICD-10-CM

## 2025-02-21 PROCEDURE — 20610 DRAIN/INJ JOINT/BURSA W/O US: CPT | Mod: RT

## 2025-02-21 PROCEDURE — 99203 OFFICE O/P NEW LOW 30 MIN: CPT | Mod: 25

## 2025-02-21 PROCEDURE — 73562 X-RAY EXAM OF KNEE 3: CPT | Mod: RT

## 2025-02-26 ENCOUNTER — APPOINTMENT (OUTPATIENT)
Dept: ORTHOPEDIC SURGERY | Facility: CLINIC | Age: 65
End: 2025-02-26

## 2025-03-04 ENCOUNTER — APPOINTMENT (OUTPATIENT)
Dept: ORTHOPEDIC SURGERY | Facility: CLINIC | Age: 65
End: 2025-03-04
Payer: COMMERCIAL

## 2025-03-04 ENCOUNTER — APPOINTMENT (OUTPATIENT)
Dept: MRI IMAGING | Facility: CLINIC | Age: 65
End: 2025-03-04
Payer: COMMERCIAL

## 2025-03-04 ENCOUNTER — NON-APPOINTMENT (OUTPATIENT)
Age: 65
End: 2025-03-04

## 2025-03-04 DIAGNOSIS — M25.461 EFFUSION, RIGHT KNEE: ICD-10-CM

## 2025-03-04 DIAGNOSIS — M23.91 UNSPECIFIED INTERNAL DERANGEMENT OF RIGHT KNEE: ICD-10-CM

## 2025-03-04 DIAGNOSIS — M17.11 UNILATERAL PRIMARY OSTEOARTHRITIS, RIGHT KNEE: ICD-10-CM

## 2025-03-04 PROCEDURE — 99213 OFFICE O/P EST LOW 20 MIN: CPT

## 2025-03-04 PROCEDURE — 73721 MRI JNT OF LWR EXTRE W/O DYE: CPT | Mod: RT

## 2025-03-10 ENCOUNTER — APPOINTMENT (OUTPATIENT)
Dept: ORTHOPEDIC SURGERY | Facility: CLINIC | Age: 65
End: 2025-03-10
Payer: COMMERCIAL

## 2025-03-10 DIAGNOSIS — S83.231D COMPLEX TEAR OF MEDIAL MENISCUS, CURRENT INJURY, RIGHT KNEE, SUBSEQUENT ENCOUNTER: ICD-10-CM

## 2025-03-10 PROCEDURE — 99214 OFFICE O/P EST MOD 30 MIN: CPT

## 2025-04-15 ENCOUNTER — APPOINTMENT (OUTPATIENT)
Age: 65
End: 2025-04-15
Payer: COMMERCIAL

## 2025-04-15 DIAGNOSIS — S83.231D COMPLEX TEAR OF MEDIAL MENISCUS, CURRENT INJURY, RIGHT KNEE, SUBSEQUENT ENCOUNTER: ICD-10-CM

## 2025-04-15 PROCEDURE — 29881 ARTHRS KNE SRG MNISECTMY M/L: CPT | Mod: AS,RT

## 2025-04-15 PROCEDURE — 29881 ARTHRS KNE SRG MNISECTMY M/L: CPT | Mod: RT

## 2025-04-15 RX ORDER — OXYCODONE AND ACETAMINOPHEN 5; 325 MG/1; MG/1
5-325 TABLET ORAL
Qty: 10 | Refills: 0 | Status: ACTIVE | COMMUNITY
Start: 2025-04-15 | End: 1900-01-01

## 2025-04-21 ENCOUNTER — APPOINTMENT (OUTPATIENT)
Dept: ORTHOPEDIC SURGERY | Facility: CLINIC | Age: 65
End: 2025-04-21
Payer: COMMERCIAL

## 2025-04-21 VITALS — BODY MASS INDEX: 28.63 KG/M2 | WEIGHT: 216 LBS | HEIGHT: 73 IN

## 2025-04-21 DIAGNOSIS — M23.91 UNSPECIFIED INTERNAL DERANGEMENT OF RIGHT KNEE: ICD-10-CM

## 2025-04-21 DIAGNOSIS — Z98.890 OTHER SPECIFIED POSTPROCEDURAL STATES: ICD-10-CM

## 2025-04-21 PROCEDURE — 99024 POSTOP FOLLOW-UP VISIT: CPT
